# Patient Record
Sex: MALE | Race: WHITE | HISPANIC OR LATINO | Employment: OTHER | URBAN - METROPOLITAN AREA
[De-identification: names, ages, dates, MRNs, and addresses within clinical notes are randomized per-mention and may not be internally consistent; named-entity substitution may affect disease eponyms.]

---

## 2022-11-03 ENCOUNTER — APPOINTMENT (EMERGENCY)
Dept: RADIOLOGY | Facility: HOSPITAL | Age: 82
End: 2022-11-03

## 2022-11-03 ENCOUNTER — APPOINTMENT (EMERGENCY)
Dept: CT IMAGING | Facility: HOSPITAL | Age: 82
End: 2022-11-03

## 2022-11-03 ENCOUNTER — HOSPITAL ENCOUNTER (EMERGENCY)
Facility: HOSPITAL | Age: 82
Discharge: HOME | End: 2022-11-03
Attending: SURGERY

## 2022-11-03 VITALS
HEART RATE: 82 BPM | TEMPERATURE: 97.8 F | WEIGHT: 162.48 LBS | SYSTOLIC BLOOD PRESSURE: 163 MMHG | DIASTOLIC BLOOD PRESSURE: 96 MMHG | OXYGEN SATURATION: 96 % | RESPIRATION RATE: 18 BRPM
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
## 2022-11-03 DIAGNOSIS — I10 HYPERTENSION, UNSPECIFIED TYPE: ICD-10-CM

## 2022-11-03 LAB
BASE EXCESS BLDA CALC-SCNC: 4 MMOL/L (ref -2–3)
CA-I BLD-SCNC: 1.19 MMOL/L (ref 1.12–1.32)
GLUCOSE SERPL-MCNC: 139 MG/DL (ref 65–140)
HCO3 BLDA-SCNC: 30.2 MMOL/L (ref 24–30)
HCT VFR BLD CALC: 44 % (ref 36.5–49.3)
HGB BLDA-MCNC: 15 G/DL (ref 12–17)
PCO2 BLD: 32 MMOL/L (ref 21–32)
PCO2 BLD: 52.4 MM HG (ref 42–50)
PH BLD: 7.37 [PH] (ref 7.3–7.4)
PO2 BLD: 20 MM HG (ref 35–45)
POTASSIUM BLD-SCNC: 4.2 MMOL/L (ref 3.5–5.3)
SAO2 % BLD FROM PO2: 30 % (ref 60–85)
SODIUM BLD-SCNC: 140 MMOL/L (ref 136–145)
SPECIMEN SOURCE: ABNORMAL

## 2022-11-03 RX ORDER — LABETALOL HYDROCHLORIDE 5 MG/ML
10 INJECTION, SOLUTION INTRAVENOUS ONCE
Status: COMPLETED | OUTPATIENT
Start: 2022-11-03 | End: 2022-11-03

## 2022-11-03 RX ADMIN — Medication 10 MG: at 18:57
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
## 2022-11-03 NOTE — PROCEDURES
POC FAST US    Date/Time: 11/3/2022 6:38 PM  Performed by: Summer Prasad MD  Authorized by: Summer Prasad MD     Patient location:  Trauma  Procedure details:     Exam Type:  Diagnostic    Assess for:  Hemothorax, intra-abdominal fluid, pericardial effusion and pneumothorax    Technique: extended FAST      Views obtained:  Heart - Pericardial sac, Left thorax, Right thorax, RUQ - Jansen's Pouch, LUQ - Splenorenal space and Suprapubic - Pouch of Ankur    Image quality: diagnostic      Image availability:  Images available in PACS and video obtained  FAST Findings:     RUQ (Hepatorenal) free fluid: absent      LUQ (Splenorenal) free fluid: absent      Suprapubic free fluid: absent      Cardiac wall motion: identified      Pericardial effusion: absent    extended FAST (Pulmonary) findings:     Left lung sliding: Present      Right lung sliding: Present      Left pleural effusion: Absent      Right pleural effusion: Absent    Interpretation:     Impressions: negative

## 2022-11-06 LAB
ATRIAL RATE: 66 BPM
QRS AXIS: -33 DEGREES
QRSD INTERVAL: 86 MS
QT INTERVAL: 368 MS
QTC INTERVAL: 447 MS
T WAVE AXIS: 47 DEGREES
VENTRICULAR RATE: 89 BPM

## 2025-01-01 ENCOUNTER — HOME CARE VISIT (OUTPATIENT)
Dept: HOME HOSPICE | Facility: HOSPICE | Age: 85
End: 2025-01-01
Payer: MEDICARE

## 2025-01-01 ENCOUNTER — TELEPHONE (OUTPATIENT)
Age: 85
End: 2025-01-01

## 2025-01-01 ENCOUNTER — HOSPICE ADMISSION (OUTPATIENT)
Dept: HOME HOSPICE | Facility: HOSPICE | Age: 85
End: 2025-01-01
Payer: MEDICARE

## 2025-01-01 ENCOUNTER — HOME CARE VISIT (OUTPATIENT)
Dept: HOME HEALTH SERVICES | Facility: HOME HEALTHCARE | Age: 85
End: 2025-01-01
Payer: MEDICARE

## 2025-01-01 VITALS
DIASTOLIC BLOOD PRESSURE: 60 MMHG | SYSTOLIC BLOOD PRESSURE: 110 MMHG | RESPIRATION RATE: 37 BRPM | HEART RATE: 129 BPM | TEMPERATURE: 98.6 F

## 2025-01-01 VITALS — RESPIRATION RATE: 26 BRPM | SYSTOLIC BLOOD PRESSURE: 180 MMHG | HEART RATE: 93 BPM | DIASTOLIC BLOOD PRESSURE: 105 MMHG

## 2025-01-01 VITALS
TEMPERATURE: 97.9 F | DIASTOLIC BLOOD PRESSURE: 100 MMHG | HEART RATE: 96 BPM | SYSTOLIC BLOOD PRESSURE: 170 MMHG | RESPIRATION RATE: 20 BRPM

## 2025-01-01 PROCEDURE — G0156 HHCP-SVS OF AIDE,EA 15 MIN: HCPCS

## 2025-01-01 PROCEDURE — 10330064 UNDERPAD, REUSE 36X36 1DZ     BECKCL

## 2025-01-01 PROCEDURE — 10330064 BRIEF, TAB CLSR ULTRA XLG 59-64 (15/BG 4

## 2025-01-01 PROCEDURE — G0299 HHS/HOSPICE OF RN EA 15 MIN: HCPCS

## 2025-01-01 PROCEDURE — 10330087 HSPC SERVICE INTENSITY ADD-ON

## 2025-01-01 PROCEDURE — 10330064 BRIEF, TAB CLSR ULTRA LG 45-58LG (18/BG

## 2025-01-01 PROCEDURE — 10330057 MEDICATION, GENERAL

## 2025-01-01 PROCEDURE — G0155 HHCP-SVS OF CSW,EA 15 MIN: HCPCS

## 2025-04-02 ENCOUNTER — APPOINTMENT (EMERGENCY)
Dept: CT IMAGING | Facility: HOSPITAL | Age: 85
End: 2025-04-02
Payer: MEDICARE

## 2025-04-02 ENCOUNTER — HOSPITAL ENCOUNTER (INPATIENT)
Facility: HOSPITAL | Age: 85
LOS: 1 days | Discharge: HOSPICE | End: 2025-04-04
Attending: EMERGENCY MEDICINE | Admitting: FAMILY MEDICINE
Payer: MEDICARE

## 2025-04-02 DIAGNOSIS — R29.90 STROKE-LIKE SYMPTOMS: Primary | ICD-10-CM

## 2025-04-02 DIAGNOSIS — Z51.5 HOSPICE CARE: ICD-10-CM

## 2025-04-02 DIAGNOSIS — I63.9 CVA (CEREBRAL VASCULAR ACCIDENT) (HCC): ICD-10-CM

## 2025-04-02 DIAGNOSIS — F03.90 DEMENTIA (HCC): ICD-10-CM

## 2025-04-02 LAB
ANION GAP SERPL CALCULATED.3IONS-SCNC: 5 MMOL/L (ref 4–13)
APTT PPP: 29 SECONDS (ref 23–34)
BUN SERPL-MCNC: 31 MG/DL (ref 5–25)
CALCIUM SERPL-MCNC: 8.7 MG/DL (ref 8.4–10.2)
CARDIAC TROPONIN I PNL SERPL HS: 10 NG/L (ref ?–50)
CHLORIDE SERPL-SCNC: 105 MMOL/L (ref 96–108)
CO2 SERPL-SCNC: 28 MMOL/L (ref 21–32)
CREAT SERPL-MCNC: 1.23 MG/DL (ref 0.6–1.3)
ERYTHROCYTE [DISTWIDTH] IN BLOOD BY AUTOMATED COUNT: 12.3 % (ref 11.6–15.1)
GFR SERPL CREATININE-BSD FRML MDRD: 52 ML/MIN/1.73SQ M
GLUCOSE SERPL-MCNC: 96 MG/DL (ref 65–140)
HCT VFR BLD AUTO: 43.3 % (ref 36.5–49.3)
HGB BLD-MCNC: 14.2 G/DL (ref 12–17)
INR PPP: 1.01 (ref 0.85–1.19)
MCH RBC QN AUTO: 30.3 PG (ref 26.8–34.3)
MCHC RBC AUTO-ENTMCNC: 32.8 G/DL (ref 31.4–37.4)
MCV RBC AUTO: 93 FL (ref 82–98)
PLATELET # BLD AUTO: 168 THOUSANDS/UL (ref 149–390)
PMV BLD AUTO: 10.7 FL (ref 8.9–12.7)
POTASSIUM SERPL-SCNC: 4.3 MMOL/L (ref 3.5–5.3)
PROTHROMBIN TIME: 14 SECONDS (ref 12.3–15)
RBC # BLD AUTO: 4.68 MILLION/UL (ref 3.88–5.62)
SODIUM SERPL-SCNC: 138 MMOL/L (ref 135–147)
WBC # BLD AUTO: 7.86 THOUSAND/UL (ref 4.31–10.16)

## 2025-04-02 PROCEDURE — 36415 COLL VENOUS BLD VENIPUNCTURE: CPT

## 2025-04-02 PROCEDURE — 85730 THROMBOPLASTIN TIME PARTIAL: CPT

## 2025-04-02 PROCEDURE — 80048 BASIC METABOLIC PNL TOTAL CA: CPT

## 2025-04-02 PROCEDURE — 99285 EMERGENCY DEPT VISIT HI MDM: CPT

## 2025-04-02 PROCEDURE — 93005 ELECTROCARDIOGRAM TRACING: CPT

## 2025-04-02 PROCEDURE — 85027 COMPLETE CBC AUTOMATED: CPT

## 2025-04-02 PROCEDURE — 70496 CT ANGIOGRAPHY HEAD: CPT

## 2025-04-02 PROCEDURE — 70498 CT ANGIOGRAPHY NECK: CPT

## 2025-04-02 PROCEDURE — 85610 PROTHROMBIN TIME: CPT

## 2025-04-02 PROCEDURE — 84484 ASSAY OF TROPONIN QUANT: CPT

## 2025-04-02 RX ADMIN — IOHEXOL 85 ML: 350 INJECTION, SOLUTION INTRAVENOUS at 22:28

## 2025-04-02 NOTE — Clinical Note
Case was discussed with DARIO and the patient's admission status was agreed to be Admission Status: inpatient status to the service of Dr. Armijo .

## 2025-04-03 ENCOUNTER — APPOINTMENT (OUTPATIENT)
Dept: NON INVASIVE DIAGNOSTICS | Facility: HOSPITAL | Age: 85
End: 2025-04-03
Payer: MEDICARE

## 2025-04-03 ENCOUNTER — HOME CARE VISIT (OUTPATIENT)
Dept: HOME HOSPICE | Facility: HOSPICE | Age: 85
End: 2025-04-03
Payer: MEDICARE

## 2025-04-03 PROBLEM — I48.91 ATRIAL FIBRILLATION (HCC): Status: ACTIVE | Noted: 2025-01-01

## 2025-04-03 PROBLEM — R29.90 STROKE-LIKE SYMPTOMS: Status: ACTIVE | Noted: 2025-04-03

## 2025-04-03 PROBLEM — F03.90 DEMENTIA (HCC): Status: ACTIVE | Noted: 2025-01-01

## 2025-04-03 PROBLEM — R26.2 AMBULATORY DYSFUNCTION: Status: ACTIVE | Noted: 2025-01-01

## 2025-04-03 PROBLEM — E04.1 THYROID NODULE: Status: ACTIVE | Noted: 2025-01-01

## 2025-04-03 PROBLEM — I10 HYPERTENSION: Status: ACTIVE | Noted: 2025-01-01

## 2025-04-03 LAB
2HR DELTA HS TROPONIN: 0 NG/L
4HR DELTA HS TROPONIN: -2 NG/L
ALBUMIN SERPL BCG-MCNC: 3.5 G/DL (ref 3.5–5)
ALP SERPL-CCNC: 48 U/L (ref 34–104)
ALT SERPL W P-5'-P-CCNC: 13 U/L (ref 7–52)
ANION GAP SERPL CALCULATED.3IONS-SCNC: 6 MMOL/L (ref 4–13)
AST SERPL W P-5'-P-CCNC: 17 U/L (ref 13–39)
ATRIAL RATE: 326 BPM
ATRIAL RATE: 71 BPM
ATRIAL RATE: 71 BPM
BASOPHILS # BLD AUTO: 0.03 THOUSANDS/ÂΜL (ref 0–0.1)
BASOPHILS NFR BLD AUTO: 1 % (ref 0–1)
BILIRUB SERPL-MCNC: 1.4 MG/DL (ref 0.2–1)
BUN SERPL-MCNC: 25 MG/DL (ref 5–25)
CALCIUM SERPL-MCNC: 8.4 MG/DL (ref 8.4–10.2)
CARDIAC TROPONIN I PNL SERPL HS: 10 NG/L (ref ?–50)
CARDIAC TROPONIN I PNL SERPL HS: 8 NG/L (ref ?–50)
CHLORIDE SERPL-SCNC: 108 MMOL/L (ref 96–108)
CHOLEST SERPL-MCNC: 164 MG/DL (ref ?–200)
CO2 SERPL-SCNC: 23 MMOL/L (ref 21–32)
CREAT SERPL-MCNC: 1.04 MG/DL (ref 0.6–1.3)
EOSINOPHIL # BLD AUTO: 0.15 THOUSAND/ÂΜL (ref 0–0.61)
EOSINOPHIL NFR BLD AUTO: 2 % (ref 0–6)
ERYTHROCYTE [DISTWIDTH] IN BLOOD BY AUTOMATED COUNT: 12.4 % (ref 11.6–15.1)
EST. AVERAGE GLUCOSE BLD GHB EST-MCNC: 126 MG/DL
GFR SERPL CREATININE-BSD FRML MDRD: 64 ML/MIN/1.73SQ M
GLUCOSE P FAST SERPL-MCNC: 82 MG/DL (ref 65–99)
GLUCOSE SERPL-MCNC: 114 MG/DL (ref 65–140)
GLUCOSE SERPL-MCNC: 82 MG/DL (ref 65–140)
HBA1C MFR BLD: 6 %
HCT VFR BLD AUTO: 40.2 % (ref 36.5–49.3)
HDLC SERPL-MCNC: 37 MG/DL
HGB BLD-MCNC: 13.3 G/DL (ref 12–17)
IMM GRANULOCYTES # BLD AUTO: 0.01 THOUSAND/UL (ref 0–0.2)
IMM GRANULOCYTES NFR BLD AUTO: 0 % (ref 0–2)
LDLC SERPL CALC-MCNC: 109 MG/DL (ref 0–100)
LYMPHOCYTES # BLD AUTO: 2.17 THOUSANDS/ÂΜL (ref 0.6–4.47)
LYMPHOCYTES NFR BLD AUTO: 34 % (ref 14–44)
MAGNESIUM SERPL-MCNC: 2.2 MG/DL (ref 1.9–2.7)
MCH RBC QN AUTO: 30.1 PG (ref 26.8–34.3)
MCHC RBC AUTO-ENTMCNC: 33.1 G/DL (ref 31.4–37.4)
MCV RBC AUTO: 91 FL (ref 82–98)
MONOCYTES # BLD AUTO: 0.59 THOUSAND/ÂΜL (ref 0.17–1.22)
MONOCYTES NFR BLD AUTO: 9 % (ref 4–12)
NEUTROPHILS # BLD AUTO: 3.35 THOUSANDS/ÂΜL (ref 1.85–7.62)
NEUTS SEG NFR BLD AUTO: 54 % (ref 43–75)
NRBC BLD AUTO-RTO: 0 /100 WBCS
PLATELET # BLD AUTO: 156 THOUSANDS/UL (ref 149–390)
PMV BLD AUTO: 10.4 FL (ref 8.9–12.7)
POTASSIUM SERPL-SCNC: 4 MMOL/L (ref 3.5–5.3)
PROT SERPL-MCNC: 5.8 G/DL (ref 6.4–8.4)
QRS AXIS: -89 DEGREES
QRS AXIS: 218 DEGREES
QRS AXIS: 225 DEGREES
QRS AXIS: 245 DEGREES
QRSD INTERVAL: 102 MS
QRSD INTERVAL: 82 MS
QRSD INTERVAL: 84 MS
QRSD INTERVAL: 98 MS
QT INTERVAL: 400 MS
QT INTERVAL: 414 MS
QT INTERVAL: 416 MS
QT INTERVAL: 432 MS
QTC INTERVAL: 445 MS
QTC INTERVAL: 452 MS
QTC INTERVAL: 456 MS
QTC INTERVAL: 480 MS
RBC # BLD AUTO: 4.42 MILLION/UL (ref 3.88–5.62)
SODIUM SERPL-SCNC: 137 MMOL/L (ref 135–147)
T WAVE AXIS: -33 DEGREES
T WAVE AXIS: 47 DEGREES
T WAVE AXIS: 5 DEGREES
T WAVE AXIS: 95 DEGREES
TRIGL SERPL-MCNC: 89 MG/DL (ref ?–150)
VENTRICULAR RATE: 64 BPM
VENTRICULAR RATE: 71 BPM
VENTRICULAR RATE: 78 BPM
VENTRICULAR RATE: 81 BPM
WBC # BLD AUTO: 6.3 THOUSAND/UL (ref 4.31–10.16)

## 2025-04-03 PROCEDURE — 80053 COMPREHEN METABOLIC PANEL: CPT

## 2025-04-03 PROCEDURE — 82948 REAGENT STRIP/BLOOD GLUCOSE: CPT

## 2025-04-03 PROCEDURE — 84484 ASSAY OF TROPONIN QUANT: CPT

## 2025-04-03 PROCEDURE — NC001 PR NO CHARGE: Performed by: PHYSICIAN ASSISTANT

## 2025-04-03 PROCEDURE — 83036 HEMOGLOBIN GLYCOSYLATED A1C: CPT

## 2025-04-03 PROCEDURE — 80061 LIPID PANEL: CPT

## 2025-04-03 PROCEDURE — 93005 ELECTROCARDIOGRAM TRACING: CPT

## 2025-04-03 PROCEDURE — 97163 PT EVAL HIGH COMPLEX 45 MIN: CPT

## 2025-04-03 PROCEDURE — 99223 1ST HOSP IP/OBS HIGH 75: CPT | Performed by: FAMILY MEDICINE

## 2025-04-03 PROCEDURE — 36415 COLL VENOUS BLD VENIPUNCTURE: CPT

## 2025-04-03 PROCEDURE — 93010 ELECTROCARDIOGRAM REPORT: CPT | Performed by: INTERNAL MEDICINE

## 2025-04-03 PROCEDURE — 85025 COMPLETE CBC W/AUTO DIFF WBC: CPT

## 2025-04-03 PROCEDURE — 83735 ASSAY OF MAGNESIUM: CPT

## 2025-04-03 PROCEDURE — 97167 OT EVAL HIGH COMPLEX 60 MIN: CPT

## 2025-04-03 RX ORDER — BISACODYL 10 MG
10 SUPPOSITORY, RECTAL RECTAL DAILY PRN
Status: DISCONTINUED | OUTPATIENT
Start: 2025-04-03 | End: 2025-04-04 | Stop reason: HOSPADM

## 2025-04-03 RX ORDER — GLYCOPYRROLATE 0.2 MG/ML
0.1 INJECTION INTRAMUSCULAR; INTRAVENOUS EVERY 4 HOURS PRN
Status: DISCONTINUED | OUTPATIENT
Start: 2025-04-03 | End: 2025-04-04 | Stop reason: HOSPADM

## 2025-04-03 RX ORDER — LORAZEPAM 2 MG/ML
1 INJECTION INTRAMUSCULAR
Status: DISCONTINUED | OUTPATIENT
Start: 2025-04-03 | End: 2025-04-04 | Stop reason: HOSPADM

## 2025-04-03 RX ORDER — ASPIRIN 81 MG/1
81 TABLET, CHEWABLE ORAL DAILY
Status: DISCONTINUED | OUTPATIENT
Start: 2025-04-03 | End: 2025-04-03

## 2025-04-03 RX ORDER — ASPIRIN 81 MG/1
81 TABLET, CHEWABLE ORAL DAILY
COMMUNITY
End: 2025-04-04

## 2025-04-03 RX ORDER — HEPARIN SODIUM 5000 [USP'U]/ML
5000 INJECTION, SOLUTION INTRAVENOUS; SUBCUTANEOUS EVERY 8 HOURS SCHEDULED
Status: DISCONTINUED | OUTPATIENT
Start: 2025-04-03 | End: 2025-04-03

## 2025-04-03 RX ORDER — ATORVASTATIN CALCIUM 40 MG/1
40 TABLET, FILM COATED ORAL EVERY EVENING
Status: DISCONTINUED | OUTPATIENT
Start: 2025-04-03 | End: 2025-04-03

## 2025-04-03 RX ORDER — CLOPIDOGREL 300 MG/1
300 TABLET, FILM COATED ORAL ONCE
Status: COMPLETED | OUTPATIENT
Start: 2025-04-03 | End: 2025-04-03

## 2025-04-03 RX ORDER — LORAZEPAM 0.5 MG
1 TABLET ORAL DAILY
COMMUNITY
End: 2025-04-04

## 2025-04-03 RX ADMIN — SODIUM CHLORIDE 1000 ML: 0.9 INJECTION, SOLUTION INTRAVENOUS at 13:23

## 2025-04-03 RX ADMIN — SODIUM CHLORIDE 500 ML: 0.9 INJECTION, SOLUTION INTRAVENOUS at 01:30

## 2025-04-03 RX ADMIN — CLOPIDOGREL BISULFATE 300 MG: 300 TABLET, FILM COATED ORAL at 01:25

## 2025-04-03 NOTE — CONSULTS
Consultation - Neurology   Name: Artie Zavala 84 y.o. male I MRN: [id number]  Unit/Bed#: 2 E 278-01 I Date of Admission: 4/2/2025   Date of Service: 4/3/2025 I Hospital Day: 0   Consult to Neurology  Consult performed by: LIVIER Rogers  Consult ordered by: Behzad Steel PA-C      Physician Requesting Evaluation: Raul Cai MD   Reason for Evaluation / Principal Problem: stroke alert    Assessment & Plan  Stroke-like symptoms   84 y.o.  male with progressive cognitive impairment over the past 5-7 years, AF not on ac by choice, possible prior stroke vs TIA who presented to [facility] ED 4/2/25 with reported right facial droop, expressive aphasia, left sided weakness and ambulatory dysfunction.     Stroke alert initiated in the ED with NIHSS 6. Initial /92, BG 96. 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 Deemed not a TNK (time window) or thrombectomy candidate based on risk vs benefit conversation. He was loaded with plavix 300mg x1 (took ASA 81mg x3 PTA) and admitted on the stroke pathway for further work-up.    Neuroimaging/work-up:  4/2/25 CTH:No acute intracranial abnormality. Chronic ischemic changes.  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  Incidental thyroid nodule(s) for which nonemergent thyroid ultrasound is recommended. However, consider patient's age and clinical status to assess need for follow-up.  -  Recommendations:  Unclear etiology of fluctuating exam; possible stroke vs seizure vs tme with baseline cognitive impairment  -continue home asa 81mg daily  -permissive htn; avoid hypotension  -Discussed initial imaging results and clinical concerns based on exam/history with family at bedside.   -Reviewed neurological recommendations for comprehensive stroke and/or seizure work-up including EEG, MRI, ECHO as well as medication recommendations for  "secondary stroke prevention and seizure prophylaxis.   -Family reported that the patient (prior to cognitive decline) expressed that he did not wish to take medicine or pursue medical interventions.  -Based on our conversation and reviewing what neurology could offer the patient in terms of work-up and treatment options it was agreed upon that palliative care consultation would be beneficial to assist family with guiding goals of care decisions. That being said, further work-up on hold pending ongoing goals of care conversations as a transition to home hospice may be an option.   -Palliative care consult placed.    Recommendations for outpatient neurological follow up have yet to be determined.    History of Present Illness   Artie Zavala is a 84 y.o.  male with progressive cognitive impairment over the past 5-7 years, AF not on ac by choice, possible prior stroke vs TIA who presented to [facility] ED 4/2/25 with reported right facial droop, expressive aphasia, left sided weakness and ambulatory dysfunction.     Stroke alert initiated in the ED with NIHSS 6. Initial /92, BG 96. 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 Deemed not a TNK (time window) or thrombectomy candidate based on risk vs benefit conversation. He was loaded with plavix 300mg x1 (took ASA 81mg x3 PTA) and admitted on the stroke pathway for further work-up.    Patient's family report symptom onset around 1pm, with noted head turned to the left, LUE rigidity  and minimal responsiveness lasting about 1 hour until he returned to nearly his baseline with the exception of limited speech and worsening confusion. He ate something, went upstairs then took a nap. When he woke up around 1830, family noted a right facial droop prompting ED evaluation. Family additionally reports occasional transient episodes of becoming \"frozen\", unresponsive and nonverbal over the " "past few years that last about 10-15 minutes. They have also noticed occasional shaking of his extremities that do not consistently correspond to \"frozen\" episodes. At baseline, he is able to tend to his ADLs including feeding and dressing himself, lives with family for additional assistance/safety.    Review of Systems   Unable to perform ROS: Mental status change      Objective :  Temp:  [96.5 °F (35.8 °C)-98.2 °F (36.8 °C)] 97.2 °F (36.2 °C)  HR:  [64-78] 65  BP: (106-179)/() 168/95  Resp:  [14-20] 16  SpO2:  [95 %-100 %] 98 %  O2 Device: Nasal cannula    Physical Exam  Vitals reviewed.   Constitutional:       General: He is not in acute distress.  HENT:      Head: Normocephalic and atraumatic.   Pulmonary:      Effort: Pulmonary effort is normal.   Skin:     General: Skin is warm and dry.     Neurological Exam  Mental Status    Fluctuating and limited exam due to AMS   At this time he is alert with very limited, slurred speech  PERRL, attends examiner but does not participate with EOM testing  Right facial droop noted  Does not FC or name objects  Able to lift all extremities antigravity, appears to somewhat neglect the left side.      I personally reviewed pertinent labs and neuroimaging as noted above    VTE Prophylaxis: VTE covered by:  heparin (porcine), Subcutaneous     Assessment, images and plan reviewed with Dr. Joe. Plan discussed with patient, family at bedside and primary service. Please refer to attending attestation for additional recommendations  "

## 2025-04-03 NOTE — CASE MANAGEMENT
Case Management Assessment & Discharge Planning Note    Patient name Artie Zavala  Location [address] [address]/2 E 278-01 MRN [id number]  : 1940 Date 4/3/2025       Current Admission Date: 2025  Current Admission Diagnosis:Stroke-like symptoms   Patient Active Problem List    Diagnosis Date Noted Date Diagnosed    Stroke-like symptoms 2025     Ambulatory dysfunction 2025     Thyroid nodule 2025     Hypertension 2025     Atrial fibrillation (HCC) 2025     Dementia (HCC) 2025       LOS (days): 0  Geometric Mean LOS (GMLOS) (days): 3  Days to GMLOS:2.9     OBJECTIVE:    Risk of Unplanned Readmission Score: 7.98      Current admission status: Inpatient       Preferred Pharmacy:   [facility]/pharmacy #[id number] - [place] - [address]  [address]  [place] [address]  Phone: [phone] Fax: [phone]    Primary Care Provider: Will Riojas MD    Primary Insurance: MEDICARE  Secondary Insurance: COMMERCIAL MISCELLANEOUS    ASSESSMENT:  Active Health Care Proxies       Artie Zavala Health Care Representative - Son, Legal Guardian   Primary Phone: [phone] (Mobile)                 Advance Directives  Does patient have a Health Care POA?: No  Was patient offered paperwork?: Yes (Son declined.)  Does patient currently have a Health Care decision maker?: Yes, please see Health Care Proxy section  Does patient have Advance Directives?: No  Was patient offered paperwork?: Yes  Primary Contact: Patient's Son (Artie)    Readmission Root Cause  30 Day Readmission: No    Patient Information  Admitted from:: Home  Mental Status: Confused  During Assessment patient was accompanied by: Not accompanied during assessment  Assessment information provided by:: Son  Primary Caregiver: Self  Support Systems: Self, Family members, Son, Daughter  County of Residence: [place]  What city do you live in?: [place]  Home entry access options. Select all that apply.:  Stairs  Number of steps to enter home.: 3  Do the steps have railings?: Yes  Type of Current Residence: Bi-level  Upon entering residence, is there a bedroom on the main floor (no further steps)?: No  A bedroom is located on the following floor levels of residence (select all that apply):: 2nd Floor  Upon entering residence, is there a bathroom on the main floor (no further steps)?: No  Indicate which floors of current residence have a bathroom (select all the apply):: 2nd Floor  Number of steps to 2nd floor from main floor: 5  Living Arrangements: Lives w/ Daughter  Is patient a ?: No    Activities of Daily Living Prior to Admission  Functional Status: Independent  Completes ADLs independently?: Yes  Ambulates independently?: Yes  Does patient use assisted devices?: Yes  Assisted Devices (DME) used: Straight Cane, Wheelchair  Does patient currently own DME?: Yes  What DME does the patient currently own?: Straight Cane, Wheelchair  Does patient have a history of Outpatient Therapy (PT/OT)?: No  Does the patient have a history of Short-Term Rehab?: No  Does patient have a history of HHC?: No  Does patient currently have HHC?: No    Patient Information Continued  Income Source: SSI/SSD  Does patient have prescription coverage?: Yes  Can the patient afford their medications and any related supplies (such as glucometers or test strips)?: Yes  Does patient receive dialysis treatments?: No  Does patient have a history of substance abuse?: No  Does patient have a history of Mental Health Diagnosis?: No    Means of Transportation  [facility]    DISCHARGE DETAILS:    Discharge planning discussed with:: Patient's Son  Freedom of Choice: Yes  Comments - Freedom of Choice: XIOMARA discussed FOC as it pertains to discharge planning. XIOMARA reviewed hospice consult, and patient's son prefers [facility], if available. Their physical address is [address] [place] [address]. XIOMARA  agreeable to send the referral now and will call patient's son back once a determination is made. Patient's son confirmed that patient will have family support in the home at all times.  CM contacted family/caregiver?: Yes  Were Treatment Team discharge recommendations reviewed with patient/caregiver?: Yes  Did patient/caregiver verbalize understanding of patient care needs?: Yes  Were patient/caregiver advised of the risks associated with not following Treatment Team discharge recommendations?: Yes    Contacts  Patient Contacts: Artie  Relationship to Patient:: Family  Contact Method: Phone  Phone Number: [phone]  Reason/Outcome: Continuity of Care, Discharge Planning    Requested Home Health Care         Is the patient interested in HHC at discharge?: No    DME Referral Provided  Referral made for DME?: No    Other Referral/Resources/Interventions Provided:  Interventions: Hospice  Referral Comments: CM sent a referral to [facility] via [facility]. Response pending.    Would you like to participate in our [facility] service program?  : No - Declined    Treatment Team Recommendation: Hospice  Discharge Destination Plan:: Hospice  Transport at Discharge : Family, BLS Ambulance

## 2025-04-03 NOTE — PLAN OF CARE
Problem: PHYSICAL THERAPY ADULT  Goal: Performs mobility at highest level of function for planned discharge setting.  See evaluation for individualized goals.  Description: Treatment/Interventions: Functional transfer training, LE strengthening/ROM, Therapeutic exercise, Endurance training, Patient/family training, Equipment eval/education, Bed mobility, Compensatory technique education, Continued evaluation, Spoke to nursing, OT, Family          See flowsheet documentation for full assessment, interventions and recommendations.  4/3/2025 1358 by Letty Cha PT  Note: Prognosis: Guarded  Problem List: Decreased strength, Decreased endurance, Impaired balance, Decreased mobility, Decreased cognition  Assessment: Pt is 84 y.o. male seen for PT evaluation on 4/3/2025 s/p admit to [facility] on 4/2/2025 w/ Stroke-like symptoms. PT was consulted to assess pt's functional mobility and d/c needs. Order placed for PT eval and tx. PTA, pt resides with daughter in multi level home, ambulates with cane prn, w/c for longer distances. At time of eval, pt requiring mod-max assist x2 for bed mobility, min assist x2 for sit to stand transfer. During sit to stand attempt, pt with episode of urinary incontinence, and pt subsequently returned to seated position. Pt noted to have change in mental status, eyes closed, clammy skin, notable drooling. Pt's RN immediately notified and she called a rapid response. Pt was returned to supine. Vitals taken upon return /94, 65bpm, 99% on RA. Upon evaluation, pt presenting with impaired functional mobility d/t decreased strength, decreased endurance, impaired balance, decreased mobility, decreased cognition, and activity intolerance. Pertinent PMHx and current co-morbidities affecting pt's physical performance at time of assessment include: stroke like symptoms, ambulatory dysfunction, thyroid nodule, HTN, a fib, dementia. Personal factors affecting pt at time of eval include:  inaccessible home environment, inability to ambulate household distances, inability to navigate level surfaces w/o external assistance, decreased cognition, and decreased initiation and engagement. The following objective measures performed on IE also reveal limitations: Barthel Index: 15/100, Modified Bert: 5 (severe disability), and AM-PAC 6-Clicks: 6/24. Pt's clinical presentation is currently unstable/unpredictable seen in pt's presentation of advanced age, abnormal lab value(s), ongoing medical assessment, and on telemetry monitoring. Overall, pt's rehab potential and prognosis to return to PLOF is guarded as impacted by objective findings, warranting pt to receive further skilled PT interventions to address identified impairments, activity limitation(s), and participation restriction(s). Pt to benefit from continued PT tx to address deficits as defined above and maximize level of functional independent mobility. From PT/mobility standpoint, recommend level 2, moderate resource intensity in order to facilitate return to PLOF.  Barriers to Discharge: Inaccessible home environment     Rehab Resource Intensity Level, PT: II (Moderate Resource Intensity)    See flowsheet documentation for full assessment.     4/3/2025 1358 by Letty Cha PT  Note: Prognosis: Guarded  Problem List: Decreased strength, Decreased endurance, Impaired balance, Decreased mobility, Decreased cognition  Assessment: Pt is 84 y.o. male seen for PT evaluation on 4/3/2025 s/p admit to [facility] on 4/2/2025 w/ Stroke-like symptoms. PT was consulted to assess pt's functional mobility and d/c needs. Order placed for PT eval and tx. PTA, pt resides with daughter in multi level home, ambulates with cane prn, w/c for longer distances. At time of eval, pt requiring mod-max assist x2 for bed mobility, min assist x2 for sit to stand transfer. During sit to stand attempt, pt with episode of urinary incontinence, and pt subsequently returned  to seated position. Pt noted to have change in mental status, eyes closed, clammy skin, notable drooling. Pt's RN immediately notified and she called a rapid response. Pt was returned to supine. Vitals taken upon return /94, 65bpm, 99% on RA. Upon evaluation, pt presenting with impaired functional mobility d/t decreased strength, decreased endurance, impaired balance, decreased mobility, decreased cognition, and activity intolerance. Pertinent PMHx and current co-morbidities affecting pt's physical performance at time of assessment include: stroke like symptoms, ambulatory dysfunction, thyroid nodule, HTN, a fib, dementia. Personal factors affecting pt at time of eval include: inaccessible home environment, inability to ambulate household distances, inability to navigate level surfaces w/o external assistance, decreased cognition, and decreased initiation and engagement. The following objective measures performed on IE also reveal limitations: Barthel Index: 15/100, Modified Bert: 5 (severe disability), and AM-PAC 6-Clicks: 6/24. Pt's clinical presentation is currently unstable/unpredictable seen in pt's presentation of advanced age, abnormal lab value(s), ongoing medical assessment, and on telemetry monitoring. Overall, pt's rehab potential and prognosis to return to PLOF is guarded as impacted by objective findings, warranting pt to receive further skilled PT interventions to address identified impairments, activity limitation(s), and participation restriction(s). Pt to benefit from continued PT tx to address deficits as defined above and maximize level of functional independent mobility. From PT/mobility standpoint, recommend level 2, moderate resource intensity in order to facilitate return to PLOF.  Barriers to Discharge: Inaccessible home environment     Rehab Resource Intensity Level, PT: II (Moderate Resource Intensity)    See flowsheet documentation for full assessment.

## 2025-04-03 NOTE — ED NOTES
Per Provider ok to end Stroke narrator and start q1h Neuro checks and vitals.      Key Palma RN  04/03/25 0057

## 2025-04-03 NOTE — QUICK NOTE
Stroke alert note    Paged at 10:17pm    Responded at 10:18pm    NIH = 9    Last known normal 1pm    Patient had new R sided symptoms. He does have dementia and can do ADLs with assistance.    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 Lou as well, family declined mechanical thrombectomy    Recommend admission stroke pathway for additional workup.

## 2025-04-03 NOTE — ASSESSMENT & PLAN NOTE
At a baseline ambulates with RW  PT/OT following  Fall precautions  Out of bed as tolerated  Encourage early and frequent mobilization  Encourage adequate hydration and nutrition  Provide adequate pain management   Continue with PT/OT for continued strength and balance training

## 2025-04-03 NOTE — ASSESSMENT & PLAN NOTE
Noted history of dementia in 2022 per chart review   Alert and Oriented X  Baseline:   No formal cognitive testing per chart review  CT stroke alert brain: moderate microangiopathic change; chronic infarction R basal ganglia stable; chronic lacunar infarctions R cerebellum, stable  TSH 2019 1.52, No B12 per chart review  Continue management of chronic conditions  Encourage physical, social and mental activities

## 2025-04-03 NOTE — CASE MANAGEMENT
Case Management Discharge Planning Note    Patient name Artie Zavala  Location [address] [place] [address]/2 [address]01 MRN [id number]  : 1940 Date 4/3/2025       Current Admission Date: 2025  Current Admission Diagnosis:Stroke-like symptoms   Patient Active Problem List    Diagnosis Date Noted Date Diagnosed    Stroke-like symptoms 2025     Ambulatory dysfunction 2025     Thyroid nodule 2025     Hypertension 2025     Atrial fibrillation (HCC) 2025     Dementia (HCC) 2025       LOS (days): 0  Geometric Mean LOS (GMLOS) (days): 3  Days to GMLOS:2.8     OBJECTIVE:  Risk of Unplanned Readmission Score: 7.98         Current admission status: Inpatient   Preferred Pharmacy:   [id number] - [place] - [address] [facility]  [place] [address]  Phone: [phone] Fax: [phone]    Primary Care Provider: Will Riojas MD    Primary Insurance: MEDICARE  Secondary Insurance: COMMERCIAL MISCELLANEOUS    DISCHARGE DETAILS:                                          Other Referral/Resources/Interventions Provided:  Referral Comments: [facility] has acepted pt. [facility] is going to evaluate.

## 2025-04-03 NOTE — ASSESSMENT & PLAN NOTE
Presented with   CTA stroke alert head/neck: L M1 occlusion  Neurology consulted  Per chart review, patient's family respecting his wishes and declined mechanical thrombectomy at this time

## 2025-04-03 NOTE — PLAN OF CARE
Problem: OCCUPATIONAL THERAPY ADULT  Goal: Performs self-care activities at highest level of function for planned discharge setting.  See evaluation for individualized goals.  Description: Treatment Interventions: ADL retraining, Functional transfer training, UE strengthening/ROM, Endurance training, Patient/family training, Equipment evaluation/education, Compensatory technique education, Continued evaluation, Energy conservation, Activityengagement          See flowsheet documentation for full assessment, interventions and recommendations.   Note: Limitation: Decreased ADL status, Decreased UE ROM, Decreased UE strength, Decreased Safe judgement during ADL, Decreased cognition, Decreased endurance, Decreased fine motor control, Decreased self-care trans, Decreased high-level ADLs  Prognosis: Fair  Assessment: Patient is a 84 y.o. male seen for OT evaluation s/p admit to [facility] on 4/2/2025 w/Stroke-like symptoms. Commorbidities affecting patient's functional performance at time of assessment include:  stroke like symptoms, ambulatory dysfunction, thyroid nodule, HTN, a fib, dementia.  Orders placed for OT evaluation and treatment.  Performed at least two patient identifiers during session including name and wristband.  Prior to admission, pt's daughter provided home s/u information and PLOF information as pt poor historian. Patient lives with family in a multi level house, 3 JOHN. 5 steps up to level bedroom/bathroom. Patient was ambulating without an AD, using SPC PRN and w/c for longer distances. Patient required some assistance for ADLs, and IADLs.  Personal factors affecting patient at time of initial evaluation include: difficulty performing ADLs and difficulty performing IADLs. Upon evaluation, patient requires maximal assist for UB ADLs, total assist for LB ADLs.   During sit to stand attempt, pt with episode of urinary incontinence, and pt subsequently returned to seated position. Pt noted  to have change in mental status, eyes closed, clammy skin, notable drooling. Pt's RN immediately notified and she called a rapid response. Pt was returned to supine. Vitals taken upon return /94, 65bpm, 99% on RA.Occupational performance is affected by the following deficits: orientation, attention span, decreased functional use of BUEs, in hand manipulation deficit with impaired reach, grasp and coordination, degenerative arthritic joint changes, impaired gross motor coordination, impaired fine motor coordination, dynamic sit/ stand balance deficit with poor standing tolerance time for self care and functional mobility, decreased activity tolerance, impaired memory, impaired problem solving, decreased safety awareness, and postural control and postural alignment deficit, requiring external assistance to complete transitional movements.  Patient to benefit from continued Occupational Therapy treatment while in the hospital to address deficits as defined above and maximize level of functional independence with ADLs and functional mobility. Occupational Performance areas to address include: eating, grooming , bathing/ shower, dressing, toilet hygiene, transfer to all surfaces, functional mobility, and IADLs: safety procedures. From OT standpoint, recommendation at time of d/c would be Level 1.     Rehab Resource Intensity Level, OT: I (Maximum Resource Intensity)

## 2025-04-03 NOTE — ASSESSMENT & PLAN NOTE
Noted history  Per chart review, not on AC and noted that since 2013  Previously ordered ASA 81 mg once daily  EKG yesterday showing Afib  ECHO being completed, pending results

## 2025-04-03 NOTE — PLAN OF CARE
Problem: PAIN - ADULT  Goal: Verbalizes/displays adequate comfort level or baseline comfort level  Description: Interventions:- Encourage patient to monitor pain and request assistance- Assess pain using appropriate pain scale- Administer analgesics based on type and severity of pain and evaluate response- Implement non-pharmacological measures as appropriate and evaluate response- Consider cultural and social influences on pain and pain management- Notify physician/advanced practitioner if interventions unsuccessful or patient reports new pain  Outcome: Progressing     Problem: INFECTION - ADULT  Goal: Absence or prevention of progression during hospitalization  Description: INTERVENTIONS:- Assess and monitor for signs and symptoms of infection- Monitor lab/diagnostic results- Monitor all insertion sites, i.e. indwelling lines, tubes, and drains- Monitor endotracheal if appropriate and nasal secretions for changes in amount and color- [place] appropriate cooling/warming therapies per order- Administer medications as ordered- Instruct and encourage patient and family to use good hand hygiene technique- Identify and instruct in appropriate isolation precautions for identified infection/condition  Outcome: Progressing  Goal: Absence of fever/infection during neutropenic period  Description: INTERVENTIONS:- Monitor WBC  Outcome: Progressing     Problem: SAFETY ADULT  Goal: Patient will remain free of falls  Description: INTERVENTIONS:- Educate patient/family on patient safety including physical limitations- Instruct patient to call for assistance with activity - Consult OT/PT to assist with strengthening/mobility - Keep Call bell within reach- Keep bed low and locked with side rails adjusted as appropriate- Keep care items and personal belongings within reach- Initiate and maintain comfort rounds- Make Fall Risk Sign visible to staff- Offer Toileting every 2 Hours, in advance of need- Initiate/Maintain bed/chair  alarm- Obtain necessary fall risk management equipment- Apply yellow socks and bracelet for high fall risk patients- Consider moving patient to room near nurses station  Outcome: Progressing  Goal: Maintain or return to baseline ADL function  Description: INTERVENTIONS:-  Assess patient's ability to carry out ADLs; assess patient's baseline for ADL function and identify physical deficits which impact ability to perform ADLs (bathing, care of mouth/teeth, toileting, grooming, dressing, etc.)- Assess/evaluate cause of self-care deficits - Assess range of motion- Assess patient's mobility; develop plan if impaired- Assess patient's need for assistive devices and provide as appropriate- Encourage maximum independence but intervene and supervise when necessary- Involve family in performance of ADLs- Assess for home care needs following discharge - Consider OT consult to assist with ADL evaluation and planning for discharge- Provide patient education as appropriate  Outcome: Progressing  Goal: Maintains/Returns to pre admission functional level  Description: INTERVENTIONS:- Perform AM-PAC 6 Click Basic Mobility/ Daily Activity assessment daily.- Set and communicate daily mobility goal to care team and patient/family/caregiver. - Collaborate with rehabilitation services on mobility goals if consulted- Perform Range of Motion 2 times a day.- Reposition patient every 2 hours.- Dangle patient 2 times a day- Stand patient 2 times a day- Ambulate patient 2 times a day- Out of bed to chair 2 times a day - Out of bed for meals 2 times a day- Out of bed for toileting- Record patient progress and toleration of activity level   Outcome: Progressing     Problem: DISCHARGE PLANNING  Goal: Discharge to home or other facility with appropriate resources  Description: INTERVENTIONS:- Identify barriers to discharge w/patient and caregiver- Arrange for needed discharge resources and transportation as appropriate- Identify discharge learning  needs (meds, wound care, etc.)- Arrange for interpretive services to assist at discharge as needed- Refer to Case Management Department for coordinating discharge planning if the patient needs post-hospital services based on physician/advanced practitioner order or complex needs related to functional status, cognitive ability, or social support system  Outcome: Progressing     Problem: Knowledge Deficit  Goal: Patient/family/caregiver demonstrates understanding of disease process, treatment plan, medications, and discharge instructions  Description: Complete learning assessment and assess knowledge base.Interventions:- Provide teaching at level of understanding- Provide teaching via preferred learning methods  Outcome: Progressing

## 2025-04-03 NOTE — SPEECH THERAPY NOTE
Speech Language/Pathology Missed Visit Note    Dysphagia evaluation orders received and appreciated.  Evaluation attempted x2.  Per chart, pt s/p RRT 2/2 unresponsiveness.  Family wishing to pursue comfort measures at this time.  Pleasure feeds have been initiated.  SLP will sign off.  Please re-consult should plan of care change.  D/w nursing.       Shanon Godwin MS, CCC-SLP  Speech-Language Pathologist  [place] #[id number]  [place] #[id number]

## 2025-04-03 NOTE — RAPID RESPONSE
Rapid Response Note  Artie Zavala 84 y.o. male MRN: [id number]  Unit/Bed#: 2 E 278-01 Encounter: [id number]    Rapid Response Notification(s):   Response called date/time:  4/3/2025 1:18 PM  Response team arrival date/time:  4/3/2025 1:19 PM  Response end date/time:  4/3/2025 1:34 PM  Level of care:  [facility]  Rapid response location:  [facility] unit  Primary reason for rapid response call:  Acute change in neuro status    Rapid Response Intervention(s):   Airway:  None  Breathing:  None  Circulation:  None  Fluids administered:  Normal saline  Medications administered:  None       Assessment:   AMS; CVA with hypoperfusion to penumbra due to orthostatism vs seizure vs worsening CVA    Plan:   Family pursuing more comfort focused care with transition to hospice  No CT scan, seizure monitoring, MAP pushes, or ICU transfer indicated at this time       Rapid Response Outcome:   Transfer:  Remain on floor  Primary service notified of transfer: Yes (Dr. Raul Cai at bedside)    Code Status: Level 3 (DNAR and DNI)      Family notified: Yes, at bedside for RRT     Background/Situation:   Artie Zavala is a 84 y.o. male who presented this with left M1 occlusion. He has a significant dementia history. He has had waxing and weaning mental status. There is a question of seizures as well. Family is largely discussing conservative care vs hospice and stroke imaging is on hold presently. He was attempting to be ambulated with PT and he had unresponsiveness, loss of bladder control and unresponsiveness. On my arrival he was only responsive to sternal rub, left sided gaze preference, reflexive hand grasping but no following of commands. His BP is markedly lower than it was previously so 1 L NSS ordered. iStat and EKG ordered. Dr. Raul Cai, Dr. Maggie Cai, and Dr. Mendoza from neurology discussed with family and they want to continue discussion with pathway toward hospice. We discontinued the remainder of fluids and are  foregoing CT scan, seizure monitoring, MAP pushes, and/or ICU upgrade presently.     Review of Systems   Unable to perform ROS: Patient unresponsive       Objective:   Vitals:    04/03/25 1206 04/03/25 1319 04/03/25 1325 04/03/25 1327   BP: (!) 171/97 118/94 140/78 118/94   BP Location:       Pulse: 67 65 72 68   Resp: 17      Temp: (!) 97.4 °F (36.3 °C)      TempSrc:       SpO2: 98% 99% 97% 94%   Weight:       Height:         Physical Exam  Vitals reviewed.   HENT:      Head: Normocephalic and atraumatic.   Cardiovascular:      Rate and Rhythm: Normal rate. Rhythm irregular.      Heart sounds: Normal heart sounds.   Pulmonary:      Breath sounds: Normal breath sounds.   Abdominal:      General: Bowel sounds are normal.      Tenderness: There is no abdominal tenderness.   Genitourinary:     Comments: Deferred  Musculoskeletal:         General: Normal range of motion.   Skin:     Capillary Refill: Capillary refill takes less than 2 seconds.   Neurological:      Mental Status: He is lethargic.     Arouses to loud voice at times, other times needing noxious stimuli. Gaze preference to the left. Does not follow commands

## 2025-04-03 NOTE — ASSESSMENT & PLAN NOTE
84 y.o.  male with progressive cognitive impairment over the past 5-7 years, AF not on ac by choice, possible prior stroke vs TIA who presented to [facility] ED 4/2/25 with reported right facial droop, expressive aphasia, left sided weakness and ambulatory dysfunction.     Stroke alert initiated in the ED with NIHSS 6. Initial /92, BG 96. 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 Deemed not a TNK (time window) or thrombectomy candidate based on risk vs benefit conversation. He was loaded with plavix 300mg x1 (took ASA 81mg x3 PTA) and admitted on the stroke pathway for further work-up.    Neuroimaging/work-up:  4/2/25 CTH:No acute intracranial abnormality. Chronic ischemic changes.  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  Incidental thyroid nodule(s) for which nonemergent thyroid ultrasound is recommended. However, consider patient's age and clinical status to assess need for follow-up.  -  Recommendations:  Unclear etiology of fluctuating exam; possible stroke vs seizure vs tme with baseline cognitive impairment  -continue home asa 81mg daily  -permissive htn; avoid hypotension  -Discussed initial imaging results and clinical concerns based on exam/history with family at bedside.   -Reviewed neurological recommendations for comprehensive stroke and/or seizure work-up including EEG, MRI, ECHO as well as medication recommendations for secondary stroke prevention and seizure prophylaxis.   -Family reported that the patient (prior to cognitive decline) expressed that he did not wish to take medicine or pursue medical interventions.  -Based on our conversation and reviewing what neurology could offer the patient in terms of work-up and treatment options it was agreed upon that palliative care consultation would be beneficial to assist family with guiding goals of care  decisions. That being said, further work-up on hold pending ongoing goals of care conversations as a transition to home hospice may be an option.   -Palliative care consult placed.

## 2025-04-03 NOTE — NURSING NOTE
Patient was working with occupational therapy when he became unresponsive, rapid response  called at 1318. Rapid Response team at bedside.       Melina Nieto RN

## 2025-04-03 NOTE — ASSESSMENT & PLAN NOTE
Incidental finding-Multinodular left lobe thyroid gland, with nodules measuring up to 2.7 cm.   POA notified, that patient will need to follow-up with a ultrasound in the future.

## 2025-04-03 NOTE — ASSESSMENT & PLAN NOTE
Blood pressure elevated upon arrival to ED at 179/92, HR 73.  Current /91, HR 71.  Permissive hypertension.  Monitor BP and HR.

## 2025-04-03 NOTE — ED PROVIDER NOTES
Time reflects when diagnosis was documented in both MDM as applicable and the Disposition within this note       Time User Action Codes Description Comment    4/2/2025 10:15 PM Behzad Steel Add [R29.90] Stroke-like symptoms     4/3/2025  1:22 AM Behzad Steel Add [I63.9] CVA (cerebral vascular accident) (HCC)     4/3/2025  3:31 AM Chiqui Gutierres Add [F03.90] Dementia (HCC)           ED Disposition       ED Disposition   Admit    Condition   Stable    Date/Time   Thu Apr 3, 2025  1:56 AM    Comment   Case was discussed with DARIO and the patient's admission status was agreed to be Admission Status: obs status to the service of Dr. Armijo .               Assessment & Plan       Medical Decision Making  This patient presents with symptoms concerning for acute CVA versus TIA. Other items on the differential include dissection, AMI, hypoglycemia or other metabolic derangement such as hepatic/uremic encephalopathy, medication side effect, or post-ictal Lincoln's paralysis. However, presentation most concerning for a CVA. EKG without evidence of STEMI or ischemia, labs with no hypoglycemia, metabolic derangements, and clinical picture does not suggest other stroke mimic. CT head showed No acute intracranial abnormality. Chronic ischemic changes. 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  Incidental thyroid nodule(s) for which nonemergent thyroid ultrasound is recommended. However, consider patient's age and clinical status to assess need for follow-up.  Patient exam does not definitively correlate with CT findings. Patient has asymmetrical smile but is weaker on the left than the right. Per family patient baseline is very forgetful even within the hour of things and events. Patient mostly laughing on my exam and is able to answer some yes or no questions. Discussed case with neurology on-call as well as endovascular. Per endovascular do not believe patient is  a great candidate for thrombectomy given age, comorbidities, and needing assistance at baseline with ADLs.  Discussed the results of the scan and possible treatment with the son Artie Zavala who is the POA. Discussed at length the tnk window and why he is not a candidate for the medication and possible risks and benefits to endovascular intervention. After lengthy discussion with the POA and his discussion with his family members they would not like to pursue endovascular intervention.  POA also states he would like him to be DNR/DNI at this time. Per neuro load plavix and admit on the stroke pathway. Patient will need neuro consult, speech swallow eval, PT/OT. Per nurse patient speech has been improving throughout stay.     Problems Addressed:  CVA (cerebral vascular accident) (HCC): acute illness or injury  Stroke-like symptoms: acute illness or injury    Amount and/or Complexity of Data Reviewed  Labs: ordered. Decision-making details documented in ED Course.  Radiology: ordered. Decision-making details documented in ED Course.    Risk  Prescription drug management.  Decision regarding hospitalization.        ED Course as of 04/04/25 0730   Wed Apr 02, 2025   2320 Occlusion on scan. Discussed with neurology.  Discussion with POA and family at bedside of occlusion and interventional options.  Discussed risk versus benefit.  Family would like to talk it over at this time.     2343 CT stroke alert brain  No acute intracranial abnormality. Chronic ischemic changes.   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     Incidental thyroid nodule(s) for which nonemergent thyroid ultrasound is recommended. However, consider patient's age and clinical status to assess need for follow-up.     2343 After discussion with POA and family they decided against intervention with endovascular.    u Apr 03, 2025   0059 Delta 2hr hsTnI: 0       Medications    iohexol (OMNIPAQUE) 350 MG/ML injection (MULTI-DOSE) 100 mL (85 mL Intravenous Given 4/2/25 2228)   sodium chloride 0.9 % bolus 500 mL (500 mL Intravenous New Bag 4/3/25 0130)   clopidogrel (PLAVIX) tablet 300 mg (300 mg Oral Given 4/3/25 0125)       ED Risk Strat Scores   HEART Risk Score      Flowsheet Row Most Recent Value   Heart Score Risk Calculator    History 0 Filed at: 04/04/2025 0651   ECG 1 Filed at: 04/04/2025 0651   Age 2 Filed at: 04/04/2025 0651   Risk Factors 1 Filed at: 04/04/2025 0651   Troponin 0 Filed at: 04/04/2025 0651   HEART Score 4 Filed at: 04/04/2025 0651          HEART Risk Score      Flowsheet Row Most Recent Value   Heart Score Risk Calculator    History 0 Filed at: 04/04/2025 0651   ECG 1 Filed at: 04/04/2025 0651   Age 2 Filed at: 04/04/2025 0651   Risk Factors 1 Filed at: 04/04/2025 0651   Troponin 0 Filed at: 04/04/2025 0651   HEART Score 4 Filed at: 04/04/2025 0651           Stroke Assessment       Row Name 04/03/25 0105             NIH Stroke Scale    Interval --      Level of Consciousness (1a.) 0      LOC Questions (1b.) 1      LOC Commands (1c.) 1      Best Gaze (2.) 0      Visual (3.) 0      Facial Palsy (4.) 1      Motor Arm, Left (5a.) 0      Motor Arm, Right (5b.) 0      Motor Leg, Left (6a.) 1      Motor Leg, Right (6b.) 0      Limb Ataxia (7.) 0      Sensory (8.) 1      Best Language (9.) 1      Dysarthria (10.) 0      Extinction and Inattention (11.) (Formerly Neglect) 0      Total 6                                      SBIRT 22yo+      Flowsheet Row Most Recent Value   Initial Alcohol Screen: US AUDIT-C     1. How often do you have a drink containing alcohol? 0 Filed at: 04/02/2025 2214   2. How many drinks containing alcohol do you have on a typical day you are drinking?  0 Filed at: 04/02/2025 2214   3a. Male UNDER 65: How often do you have five or more drinks on one occasion? 0 Filed at: 04/02/2025 2214   Audit-C Score 0 Filed at: 04/02/2025 2214   JAYLON: How many  "times in the past year have you...    Used an illegal drug or used a prescription medication for non-medical reasons? Never Filed at: 04/02/2025 4494                            History of Present Illness       Chief Complaint   Patient presents with    CVA/TIA-like Symptoms     Pt presents to ER from home w/ stroke like symptoms, LKW was at 1pm today,  Slurred/Expression Aphasia started at 1 pm , after patients nap, patient began to not respond to Family like normal. Pt had right sided droop, and left side weakness. Family states\" Patient is normally weak on the left side since his previous stroke.\"       History reviewed. No pertinent past medical history.   History reviewed. No pertinent surgical history.   History reviewed. No pertinent family history.   Social History     Tobacco Use    Smoking status: Never    Smokeless tobacco: Never   Vaping Use    Vaping status: Never Used   Substance Use Topics    Alcohol use: Never    Drug use: Never      E-Cigarette/Vaping    E-Cigarette Use Never User       E-Cigarette/Vaping Substances    Nicotine No     THC No     CBD No     Flavoring No     Other No     Unknown No       I have reviewed and agree with the history as documented.     The patient is a 84 y.o. male with a history of afib and cognitive decline who presents to [facility] with a chief complaint of stroke-like symptoms. Per family symptoms began around 1pm with trouble walking, confusion, difficulty with word finding and then they report the patient had slight improvement symptoms but not resolution and then took a nap and woke up around 6pm with worsening of his symptoms. Associated symptoms include right sided facial droop, confusion, decreased speech, left sided weakness. Symptoms are aggravated with none noted and alleviating factors include none noted. Family reports no noticed infectious symptoms, they deny fever, falls, trauma, cough, sputum, hemoptysis, shortness of breath, " hematochezia, hematemesis, hematuria, dysuria, frequency, urgency, hesitancy, nausea, vomiting, diarrhea.  No other reported symptoms at this time.  Patient family denies allergies to anything  Per family patient had symptoms of trouble word-finding, trouble walking and right sided weakness  Patient needs help with ADLs at baseline but family reports he is in relatively good physical shape.   Patient unable to state where he is, the month, or his age. He is able to answer certain yes or no questions.  Last known well 1pm - out of tnk window   NIHSS 6 although difficult with history of dementia with poor memory. Most of the history was obtained from the family bedside      History provided by:  Relative   used: No    CVA/TIA-like Symptoms  Presenting symptoms: weakness    Presenting symptoms: no headaches    Associated symptoms: no chest pain, no dizziness, no fever, no seizures and no vomiting        Review of Systems   Unable to perform ROS: Dementia   Constitutional:  Negative for chills and fever.   HENT:  Negative for ear pain and sore throat.    Eyes:  Negative for pain and visual disturbance.   Respiratory:  Negative for cough and shortness of breath.    Cardiovascular:  Negative for chest pain and palpitations.   Gastrointestinal:  Negative for abdominal pain and vomiting.   Genitourinary:  Negative for dysuria and hematuria.   Musculoskeletal:  Negative for arthralgias and back pain.   Skin:  Negative for color change and rash.   Neurological:  Positive for facial asymmetry, speech difficulty and weakness. Negative for dizziness, tremors, seizures, syncope, light-headedness, numbness and headaches.   All other systems reviewed and are negative.          Objective       ED Triage Vitals   Temperature Pulse Blood Pressure Respirations SpO2 Patient Position - Orthostatic VS   04/02/25 2217 04/02/25 2215 04/02/25 2215 04/02/25 2215 04/02/25 2215 04/02/25 2216   98.2 °F (36.8 °C) 73 (!) 179/92  18 99 % Lying      Temp Source Heart Rate Source BP Location FiO2 (%) Pain Score    04/02/25 2217 04/02/25 2230 04/02/25 2216 -- 04/03/25 1303    Oral Monitor Left arm  No Pain      Vitals      Date and Time Temp Pulse SpO2 Resp BP Pain Score FACES Pain Rating User   04/03/25 1327 -- 68 94 % -- 118/94 -- -- YC   04/03/25 1325 -- 72 97 % -- 140/78 -- -- YC   04/03/25 1319 -- 65 99 % -- 118/94 -- -- DII   04/03/25 1319 -- -- -- -- -- No Pain -- RV   04/03/25 1303 -- -- -- -- -- No Pain -- KF   04/03/25 1206 97.4 °F (36.3 °C) 67 98 % 17 171/97 -- -- DII   04/03/25 1010 98 °F (36.7 °C) 74 98 % 17 175/110 -- -- DII   04/03/25 1000 98 °F (36.7 °C) -- -- 17 175/110 -- -- AP   04/03/25 0806 97.2 °F (36.2 °C) 78 99 % 18 165/103 -- -- DII   04/03/25 0800 97.2 °F (36.2 °C) -- -- 18 165/103 -- -- AP   04/03/25 0700 97.2 °F (36.2 °C) -- -- 18 165/103 -- -- AP   04/03/25 0600 97.2 °F (36.2 °C) 65 -- 16 168/95 -- -- SD   04/03/25 0557 97.2 °F (36.2 °C) 72 98 % -- 168/95 -- -- DII   04/03/25 0500 96.5 °F (35.8 °C) 64 -- 16 106/70 -- -- SD   04/03/25 0400 97.1 °F (36.2 °C) -- -- -- 158/133 -- -- PA   04/03/25 0400 -- 72 100 % 16 -- -- -- VR   04/03/25 0300 97.5 °F (36.4 °C) 71 100 % -- 177/90 -- 0 SD   04/03/25 0200 -- 71 99 % 20 156/91 -- -- JT   04/03/25 0100 -- 72 97 % 18 168/78 -- -- JT   04/03/25 0045 -- 70 97 % 17 167/73 -- -- JT   04/03/25 0030 -- 71 98 % 19 159/74 -- -- JT   04/03/25 0015 -- 71 99 % 16 154/88 -- -- JT   04/03/25 0000 -- 72 96 % 19 154/82 -- -- JT   04/02/25 2345 -- 71 99 % 19 162/88 -- -- JT   04/02/25 2330 -- 69 95 % 17 136/70 -- -- JT   04/02/25 2315 -- 65 95 % 19 147/73 -- -- JT   04/02/25 2300 -- 71 98 % 14 145/67 -- -- AM   04/02/25 2245 -- 68 98 % 20 166/79 -- -- AM   04/02/25 2230 -- 76 98 % 18 158/86 -- -- AM   04/02/25 2226 -- 72 99 % 17 179/92 -- -- AM   04/02/25 2217 98.2 °F (36.8 °C) -- -- -- -- -- -- AM   04/02/25 2216 -- 78 98 % 18 179/92 -- -- AM   04/02/25 2215 -- 73 99 % 18 179/92 -- -- AM             Physical Exam  Vitals reviewed.   Constitutional:       General: He is not in acute distress.     Appearance: Normal appearance. He is not ill-appearing or toxic-appearing.   HENT:      Head: Normocephalic.      Right Ear: Tympanic membrane, ear canal and external ear normal.      Left Ear: Tympanic membrane, ear canal and external ear normal.      Nose: Nose normal.      Mouth/Throat:      Mouth: Mucous membranes are moist.      Pharynx: Oropharynx is clear.   Eyes:      General: Gaze aligned appropriately.      Conjunctiva/sclera: Conjunctivae normal.      Pupils: Pupils are equal, round, and reactive to light.      Comments: Left pterygium    Neck:      Vascular: No carotid bruit.   Cardiovascular:      Rate and Rhythm: Normal rate.      Pulses: Normal pulses.   Pulmonary:      Effort: Pulmonary effort is normal. No respiratory distress.      Breath sounds: No stridor. No wheezing or rhonchi.   Abdominal:      General: Abdomen is flat. Bowel sounds are normal.      Tenderness: There is no abdominal tenderness.   Musculoskeletal:      Cervical back: Normal range of motion. No tenderness.      Right lower leg: No edema.      Left lower leg: No edema.   Skin:     General: Skin is warm and dry.      Capillary Refill: Capillary refill takes less than 2 seconds.      Coloration: Skin is not jaundiced or pale.      Findings: No bruising, erythema or rash.   Neurological:      Mental Status: He is alert. He is disoriented.      Cranial Nerves: Facial asymmetry present.      Sensory: Sensation is intact.      Motor: Weakness, abnormal muscle tone and pronator drift present.      Gait: Gait abnormal.      Comments: Decreased left sided  and leg strength. Right sided facial droop         Results Reviewed       Procedure Component Value Units Date/Time    HS Troponin I 4hr [[id number]]  (Normal) Collected: 04/03/25 0534    Lab Status: Final result Specimen: Blood Updated: 04/03/25 0607     hs TnI 4hr 8 ng/L       Delta 4hr hsTnI -2 ng/L     HS Troponin I 2hr [[id number]]  (Normal) Collected: 04/03/25 0023    Lab Status: Final result Specimen: Blood from Arm, Right Updated: 04/03/25 0056     hs TnI 2hr 10 ng/L      Delta 2hr hsTnI 0 ng/L     HS Troponin 0hr (reflex protocol) [[id number]]  (Normal) Collected: 04/02/25 2222    Lab Status: Final result Specimen: Blood from Arm, Right Updated: 04/02/25 2251     hs TnI 0hr 10 ng/L     Basic metabolic panel [[id number]]  (Abnormal) Collected: 04/02/25 2222    Lab Status: Final result Specimen: Blood from Arm, Right Updated: 04/02/25 2248     Sodium 138 mmol/L      Potassium 4.3 mmol/L      Chloride 105 mmol/L      CO2 28 mmol/L      ANION GAP 5 mmol/L      BUN 31 mg/dL      Creatinine 1.23 mg/dL      Glucose 96 mg/dL      Calcium 8.7 mg/dL      eGFR 52 ml/min/1.73sq m     Narrative:      [employer] guidelines for Chronic Kidney Disease (CKD):     Stage 1 with normal or high GFR (GFR > 90 mL/min/1.73 square meters)    Stage 2 Mild CKD (GFR = 60-89 mL/min/1.73 square meters)    Stage 3A Moderate CKD (GFR = 45-59 mL/min/1.73 square meters)    Stage 3B Moderate CKD (GFR = 30-44 mL/min/1.73 square meters)    Stage 4 Severe CKD (GFR = 15-29 mL/min/1.73 square meters)    Stage 5 End Stage CKD (GFR <15 mL/min/1.73 square meters)  Note: GFR calculation is accurate only with a steady state creatinine    Protime-INR [[id number]]  (Normal) Collected: 04/02/25 2222    Lab Status: Final result Specimen: Blood from Arm, Right Updated: 04/02/25 2241     Protime 14.0 seconds      INR 1.01    Narrative:      INR Therapeutic Range    Indication                                             INR Range      Atrial Fibrillation                                               2.0-3.0  Hypercoagulable State                                    2.0.2.3  Left Ventricular Asist Device                            2.0-3.0  Mechanical Heart Valve                                  -    Aortic(with  afib, MI, embolism, HF, LA enlargement,    and/or coagulopathy)                                     2.0-3.0 (2.5-3.5)     Mitral                                                             2.5-3.5  Prosthetic/Bioprosthetic Heart Valve               2.0-3.0  Venous thromboembolism (VTE: VT, PE        2.0-3.0    APTT [[id number]]  (Normal) Collected: 04/02/25 2222    Lab Status: Final result Specimen: Blood from Arm, Right Updated: 04/02/25 2241     PTT 29 seconds     CBC and Platelet [[id number]]  (Normal) Collected: 04/02/25 2222    Lab Status: Final result Specimen: Blood from Arm, Right Updated: 04/02/25 2233     WBC 7.86 Thousand/uL      RBC 4.68 Million/uL      Hemoglobin 14.2 g/dL      Hematocrit 43.3 %      MCV 93 fL      MCH 30.3 pg      MCHC 32.8 g/dL      RDW 12.3 %      Platelets 168 Thousands/uL      MPV 10.7 fL             CTA stroke alert (head/neck)   Final Interpretation by Korey Adame DO (04/02 2250)      Left M1 occlusion      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      Incidental thyroid nodule(s) for which nonemergent thyroid ultrasound is recommended. However, consider patient's age and clinical status to assess need for follow-up.         Message sent through [employer] at 10:39PM on 4/2/25. Discussed this study with Dr. Damian on 4/2/2025 10:48 PM.            Workstation performed: [id number]         CT stroke alert brain   Final Interpretation by Korey Adame DO (04/02 2250)      No acute intracranial abnormality. Chronic ischemic changes.         Message sent through [employer] at 10:39PM on 4/2/25. Discussed this study with Dr. Damian on 4/2/2025 10:48 PM.      Workstation performed: [id number]             Procedures    ED Medication and Procedure Management   Prior to Admission Medications   Prescriptions Last Dose Informant Patient Reported? Taking?   Misc Natural Products ([employer]) CAPS Past Week  Yes Yes   Sig: Take 1 capsule by mouth  in the morning   aspirin 81 mg chewable tablet Past Week  Yes Yes   Sig: Chew 81 mg daily      Facility-Administered Medications: None     Current Discharge Medication List        CONTINUE these medications which have NOT CHANGED    Details   aspirin 81 mg chewable tablet Chew 81 mg daily      Misc Natural Products ([employer]) CAPS Take 1 capsule by mouth in the morning           No discharge procedures on file.  ED SEPSIS DOCUMENTATION   Time reflects when diagnosis was documented in both MDM as applicable and the Disposition within this note       Time User Action Codes Description Comment    4/2/2025 10:15 PM Behzad Steel [R29.90] Stroke-like symptoms     4/3/2025  1:22 AM Behzad Steel [I63.9] CVA (cerebral vascular accident) ([facility])     4/3/2025  3:31 AM Chiqui Gutierres [F03.90] Dementia ([facility])                  Behzad Steel PA-C  04/04/25 0731

## 2025-04-03 NOTE — ASSESSMENT & PLAN NOTE
Patient presents to the ED with strokelike symptoms, right facial droop, difficulty word finding and weakness with difficulty ambulating.  Patient received 3 aspirins at 81 mg at home prior to arriving to ED.  Stroke alert was called.  ED provider discussed case with neurology and endovascular. Currently patient is not a good candidate for endovascular thrombectomy, and also recommendation stroke pathway and MRI.  ED provider discussed decisions from neurology and endovascular with patient's family and POA.  And family and POA are in agreement with not attempting endovascular procedure at this time due to patient's risk with age.  ED gave Plavix and normal saline bolus.  Blood pressure elevated upon arrival to ED at 179/92, HR 73.  Troponin-10-10, pending third troponin.  Delta 0.  EKG-atrial fibrillation.  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   Incidental finding-Multinodular left lobe thyroid gland, with nodules measuring up to 2.7 cm   POA notified.  CT stroke brain-negative for anything acute.    Plan  Allow permissive hypertension.  Start aspirin, hold off on atorvastatin per Son patients POA. To honor patients wishes patients son his POA, states he will hold off on medications. Risks explained and son states he understands risks but would like to follow fathers wishes.   Telemetry monitor.  Echo ordered.  Son POA would like to hold off on ordering MRI until speaking with neurology regarding plan of care for patient.   Lipid and hemoglobin A1c ordered.  Stroke pathway.  Speech eval prior to p.o. diet.  Aspiration precautions.  Neurology consult appreciated.

## 2025-04-03 NOTE — ASSESSMENT & PLAN NOTE
Patient has history of atrial fibrillation. Patient was placed on medications in the past ans refused to continue taking them. To honor patients wishes patients son his POA, states he will hold off on any rate control medications and blood thinners. Risks explained and son states he understands risks but would like to follow fathers wishes.   EKG-atrial fibrillation.  Telemetry ordered.

## 2025-04-03 NOTE — OCCUPATIONAL THERAPY NOTE
"          Occupational Therapy Evaluation        Patient Name: Artie Zavala  Today's Date: 4/3/2025         04/03/25 1319   OT Last Visit   OT Visit Date 04/03/25   Note Type   Note type Evaluation   Pain Assessment   Pain Assessment Tool 0-10   Pain Score No Pain   Restrictions/Precautions   Weight Bearing Precautions Per Order No   Braces or Orthoses   (none per pt's daughter)   Other Precautions Cognitive;Bed Alarm;Telemetry;Fall Risk;Aspiration   Home Living   Type of Home House   Home Layout Multi-level;Stairs to enter without rails;Bed/bath upstairs;Performs ADLs on one level  (3 JOHN. 5 steps up to level bedroom/bathroom)   Bathroom Shower/Tub Tub/shower unit   Bathroom Toilet Standard   Bathroom Equipment Shower chair   Bathroom Accessibility Accessible   Home Equipment Cane;Wheelchair-manual   Additional Comments cane used prn, w/c used for distances   Prior Function   Level of [place] Independent with functional mobility;Needs assistance with ADLs;Needs assistance with IADLS  (requires cues for ADLs)   Lives With Daughter   Receives Help From Family   IADLs Family/Friend/Other provides transportation;Family/Friend/Other provides meals;Family/Friend/Other provides medication management   Falls in the last 6 months 0   Vocational Retired  (farmer- cauliflower/ cabbage)   Lifestyle   Autonomy pt's daughter provided home s/u information and PLOF information as pt poor historian. Patient lives with family in a multi level house, 3 JOHN. 5 steps up to level bedroom/bathroom. Patient was ambulating without an AD, using SPC PRN and w/c for longer distances. Patient required some assistance for ADLs, and IADLs.   Reciprocal Relationships Supportive Family   Service to Others retired Farmer   Intrinsic Gratification \"enjoys taking walks and being outdoors\"   General   Family/Caregiver Present Yes  (Son and daughter present)   ADL   Where Assessed Edge of bed   Eating Assistance 2  Maximal Assistance "   Grooming Assistance 2  Maximal Assistance   UB Bathing Assistance 2  Maximal Assistance   LB Bathing Assistance 1  Total Assistance   UB Dressing Assistance 1  Total Assistance   LB Dressing Assistance 1  Total Assistance   Toileting Assistance  1  Total Assistance   Functional Assistance 4  Minimal Assistance   Bed Mobility   Supine to Sit 3  Moderate assistance   Additional items Assist x 2;HOB elevated;Bedrails;Increased time required;Verbal cues;LE management   Sit to Supine 2  Maximal assistance   Additional items Assist x 2;HOB elevated;Bedrails;Increased time required;Verbal cues;LE management   Transfers   Sit to Stand 4  Minimal assistance   Additional items Assist x 2;Armrests;Increased time required;Verbal cues   Stand to Sit 4  Minimal assistance   Additional items Assist x 2;Armrests;Increased time required;Verbal cues   Additional Comments During sit to stand attempt, pt with episode of urinary incontinence, and pt subsequently returned to seated position. Pt noted to have change in mental status, eyes closed, clammy skin, notable drooling. Pt's RN immediately notified and she called a rapid response. Pt was returned to supine. Vitals taken upon return /94, 65bpm, 99% on RA.   Balance   Static Sitting Fair -   Dynamic Sitting Poor +   Static Standing Poor   Activity Tolerance   Activity Tolerance Treatment limited secondary to medical complications (Comment);Patient limited by fatigue   Medical Staff Made Aware Pt seen as a co-eval with PT due to the patient's co-morbidities, clinically unstable presentation, and present impairments which are a regression from the patient's baseline.   Nurse Made Aware HAIM ROBERTS Assessment   RUE Assessment X  (limited functional use of uppers on command/ noted spontaneous use of uppers reaching and holding onto grab bar, gown and walker handle.)   LUE Assessment   LUE Assessment X  (limited functional use of uppers on command/ noted spontaneous use of  uppers reaching and holding onto grab bar, gown and walker handle.)   Hand Function   Gross Motor Coordination Impaired   Fine Motor Coordination Impaired   Vision-Basic Assessment   Current Vision Other (Comment)  (pt's dtr reporting pt with h/o wearing glasses, however not wearing anymore per eye doctor rec)   Cognition   Overall Cognitive Status Impaired   Arousal/Participation Alert;Cooperative   Attention Difficulty attending to directions   Orientation Level Unable to assess   Memory Unable to assess   Following Commands Follows one step commands inconsistently   Assessment   Limitation Decreased ADL status;Decreased UE ROM;Decreased UE strength;Decreased Safe judgement during ADL;Decreased cognition;Decreased endurance;Decreased fine motor control;Decreased self-care trans;Decreased high-level ADLs   Prognosis Fair   Assessment Patient is a 84 y.o. male seen for OT evaluation s/p admit to [facility] on 4/2/2025 w/Stroke-like symptoms. Commorbidities affecting patient's functional performance at time of assessment include:  stroke like symptoms, ambulatory dysfunction, thyroid nodule, HTN, a fib, dementia.  Orders placed for OT evaluation and treatment.  Performed at least two patient identifiers during session including name and wristband.  Prior to admission, pt's daughter provided home s/u information and PLOF information as pt poor historian. Patient lives with family in a multi level house, 3 JOHN. 5 steps up to level bedroom/bathroom. Patient was ambulating without an AD, using SPC PRN and w/c for longer distances. Patient required some assistance for ADLs, and IADLs.  Personal factors affecting patient at time of initial evaluation include: difficulty performing ADLs and difficulty performing IADLs. Upon evaluation, patient requires maximal assist for UB ADLs, total assist for LB ADLs.   During sit to stand attempt, pt with episode of urinary incontinence, and pt subsequently returned to  seated position. Pt noted to have change in mental status, eyes closed, clammy skin, notable drooling. Pt's RN immediately notified and she called a rapid response. Pt was returned to supine. Vitals taken upon return /94, 65bpm, 99% on RA.Occupational performance is affected by the following deficits: orientation, attention span, decreased functional use of BUEs, in hand manipulation deficit with impaired reach, grasp and coordination, degenerative arthritic joint changes, impaired gross motor coordination, impaired fine motor coordination, dynamic sit/ stand balance deficit with poor standing tolerance time for self care and functional mobility, decreased activity tolerance, impaired memory, impaired problem solving, decreased safety awareness, and postural control and postural alignment deficit, requiring external assistance to complete transitional movements.  Patient to benefit from continued Occupational Therapy treatment while in the hospital to address deficits as defined above and maximize level of functional independence with ADLs and functional mobility. Occupational Performance areas to address include: eating, grooming , bathing/ shower, dressing, toilet hygiene, transfer to all surfaces, functional mobility, and IADLs: safety procedures. From OT standpoint, recommendation at time of d/c would be Level 1.   Plan   Treatment Interventions ADL retraining;Functional transfer training;UE strengthening/ROM;Endurance training;Patient/family training;Equipment evaluation/education;Compensatory technique education;Continued evaluation;Energy conservation;Activityengagement   Goal Expiration Date 04/17/25   OT Frequency 3-5x/wk   Discharge Recommendation   Rehab Resource Intensity Level, OT I (Maximum Resource Intensity)   AM-PAC Daily Activity Inpatient   Lower Body Dressing 1   Bathing 1   Toileting 1   Upper Body Dressing 2   Grooming 2   Eating 2   Daily Activity Raw Score 9   AM-PAC Applied Cognition  Inpatient   Following a Speech/Presentation 2   Understanding Ordinary Conversation 2   Taking Medications 1   Remembering Where Things Are Placed or Put Away 1   Remembering List of 4-5 Errands 1   Taking Care of Complicated Tasks 1   Applied Cognition Raw Score 8   Applied Cognition Standardized Score 19.32   Barthel Index   Feeding 0   Bathing 0   Grooming Score 0   Dressing Score 0   Bladder Score 0   Bowels Score 5   Toilet Use Score 5   Transfers (Bed/Chair) Score 5   Mobility (Level Surface) Score 0   Stairs Score 0   Barthel Index Score 15         Occupational Therapy goals:  1- Patient will complete self feeding task with set up assist  2- Patient will complete rolling to R/L with use of side rail and min assist x1.   3- Patient will increase OOB/ sitting tolerance to 2-4 hours per day for increased participation in self care and leisure tasks with no s/s of exertion.  4- Patient will verbalize and demonstrate weight shifting technique in bed and sitting position with 10% verbal cues  5- Patient will complete grooming task with set up assist.   6-Patient/ Family  will demonstrate competency with UE Home Exercise Program.  7- Patient will complete Interest checklist to explore participation in play/ leisure tasks for increased satisfaction and quality of life.

## 2025-04-03 NOTE — ASSESSMENT & PLAN NOTE
Patient was also experiencing difficulty ambulating.  Per family patient has dementia and needs help with ADLs at his baseline.  PT/OT evaluation and treat.  Fall precautions.

## 2025-04-03 NOTE — ASSESSMENT & PLAN NOTE
"CTA stroke alert (head/neck): \"Incidental thyroid nodule(s) for which nonemergent thyroid ultrasound is recommended. However, consider patient's age and clinical status to assess need for follow-up\"   "

## 2025-04-03 NOTE — PHYSICAL THERAPY NOTE
Physical Therapy Evaluation     Patient's Name: Artie Zavala    Admitting Diagnosis  TIA (transient ischemic attack) [G45.9]  CVA (cerebral vascular accident) (HCC) [I63.9]  Stroke-like symptoms [R29.90]    Problem List  Patient Active Problem List   Diagnosis    Stroke-like symptoms    Ambulatory dysfunction    Thyroid nodule    Hypertension    Atrial fibrillation (HCC)    Dementia (HCC)       Past Medical History  History reviewed. No pertinent past medical history.    Past Surgical History  History reviewed. No pertinent surgical history.         04/03/25 1303   PT Last Visit   PT Visit Date 04/03/25   Note Type   Note type Evaluation   Pain Assessment   Pain Assessment Tool 0-10   Pain Score No Pain   Restrictions/Precautions   Weight Bearing Precautions Per Order No   Braces or Orthoses   (none per pt's daughter)   Other Precautions Cognitive;Bed Alarm;Telemetry;Fall Risk;Aspiration   Home Living   Type of Home House   Home Layout Multi-level;Stairs to enter without rails;Bed/bath upstairs;Performs ADLs on one level  (3 JOHN. 5 steps up to level bedroom/bathroom)   Bathroom Shower/Tub Tub/shower unit   Bathroom Toilet Standard   Bathroom Equipment Shower chair   Bathroom Accessibility Accessible   Home Equipment Cane;Wheelchair-manual   Additional Comments cane used prn, w/c used for distances   Prior Function   Level of [place] Independent with functional mobility;Needs assistance with ADLs;Needs assistance with IADLS  (requires cues for ADLs)   Lives With Daughter   Receives Help From Family   IADLs Family/Friend/Other provides transportation;Family/Friend/Other provides meals;Family/Friend/Other provides medication management   Falls in the last 6 months 0   Vocational Retired  (farmer- cauliflower/ cabbage)   Comments pt's daughter provided home s/u information and PLOF information as pt poor historian   General   Family/Caregiver Present Yes   Cognition   Overall Cognitive Status Impaired    Arousal/Participation Arousable   Orientation Level Unable to assess   Following Commands Follows one step commands inconsistently   Comments pt's speech unintelligible   RLE Assessment   RLE Assessment   (SHELLY to formally evaluate d/t cognitive deficits, recommend continued assessment next session)   LLE Assessment   LLE Assessment   (SHELLY to formally evaluate d/t cognitive deficits, recommend continued assessment)   Vision-Basic Assessment   Current Vision   (pt's dtr reporting pt with h/o wearing glasses, however not wearing anymore per eye doctor rec)   Coordination   Movements are Fluid and Coordinated   (SHELLY to formally evaluate d/t cognitive deficits, recommend continued assessment next session)   Sensation   (SHELLY to formally evaluate d/t cognitive deficits, recommend continued assessment next session)   Bed Mobility   Supine to Sit 3  Moderate assistance   Additional items Assist x 2;HOB elevated;Bedrails;Increased time required;Verbal cues;LE management   Sit to Supine 2  Maximal assistance   Additional items Assist x 2;HOB elevated;Bedrails;Increased time required;Verbal cues;LE management   Transfers   Sit to Stand 4  Minimal assistance   Additional items Assist x 2;Armrests;Increased time required;Verbal cues   Stand to Sit 4  Minimal assistance   Additional items Assist x 2;Armrests;Increased time required;Verbal cues   Additional Comments During sit to stand attempt, pt with episode of urinary incontinence, and pt subsequently returned to seated position. Pt noted to have change in mental status, eyes closed, clammy skin, notable drooling. Pt's RN immediately notified and she called a rapid response. Pt was returned to supine. Vitals taken upon return /94, 65bpm, 99% on RA.   Ambulation/Elevation   Gait pattern Not tested;Not appropriate   Balance   Static Sitting Fair -   Dynamic Sitting Poor +   Static Standing Poor   Dynamic Standing   (DNT)   Endurance Deficit   Endurance Deficit Yes    Activity Tolerance   Activity Tolerance Treatment limited secondary to medical complications (Comment);Patient limited by fatigue   Medical Staff Made Aware Pt seen as a co-eval with OT due to the patient's co-morbidities, clinically unstable presentation, and present impairments which are a regression from the patient's baseline.   Nurse Made Aware HAIM Summers   Assessment   Prognosis Guarded   Problem List Decreased strength;Decreased endurance;Impaired balance;Decreased mobility;Decreased cognition   Assessment Pt is 84 y.o. male seen for PT evaluation on 4/3/2025 s/p admit to [facility] on 4/2/2025 w/ Stroke-like symptoms. PT was consulted to assess pt's functional mobility and d/c needs. Order placed for PT eval and tx. PTA, pt resides with daughter in multi level home, ambulates with cane prn, w/c for longer distances. At time of eval, pt requiring mod-max assist x2 for bed mobility, min assist x2 for sit to stand transfer. During sit to stand attempt, pt with episode of urinary incontinence, and pt subsequently returned to seated position. Pt noted to have change in mental status, eyes closed, clammy skin, notable drooling. Pt's RN immediately notified and she called a rapid response. Pt was returned to supine. Vitals taken upon return /94, 65bpm, 99% on RA. Upon evaluation, pt presenting with impaired functional mobility d/t decreased strength, decreased endurance, impaired balance, decreased mobility, decreased cognition, and activity intolerance. Pertinent PMHx and current co-morbidities affecting pt's physical performance at time of assessment include: stroke like symptoms, ambulatory dysfunction, thyroid nodule, HTN, a fib, dementia. Personal factors affecting pt at time of eval include: inaccessible home environment, inability to ambulate household distances, inability to navigate level surfaces w/o external assistance, decreased cognition, and decreased initiation and engagement. The  following objective measures performed on IE also reveal limitations: Barthel Index: 15/100, Modified [place]: 5 (severe disability), and AM-PAC 6-Clicks: 6/24. Pt's clinical presentation is currently unstable/unpredictable seen in pt's presentation of advanced age, abnormal lab value(s), ongoing medical assessment, and on telemetry monitoring. Overall, pt's rehab potential and prognosis to return to PLOF is guarded as impacted by objective findings, warranting pt to receive further skilled PT interventions to address identified impairments, activity limitation(s), and participation restriction(s). Pt to benefit from continued PT tx to address deficits as defined above and maximize level of functional independent mobility. From PT/mobility standpoint, recommend level 2, moderate resource intensity in order to facilitate return to PLOF.   Barriers to Discharge Inaccessible home environment   Goals   Patient Goals none expressed   STG Expiration Date 04/13/25   Short Term Goal #1 In 7-10 days: Increase bilateral LE strength 1/2 grade to facilitate independent mobility, Perform all bed mobility tasks with min A of 1 to decrease caregiver burden, Perform all transfers with min A of 1 to improve independence, Increase static and dynamic sitting and static and dynamic standing balance 1/2 grade to decrease risk for falls, Improve Barthel Index score to 30 or greater to facilitate independence, and PT to see and establish goals for ambulation, elevations/stairs, ambulatory balance when appropriate   PT Treatment Day 0   Plan   Treatment/Interventions Functional transfer training;LE strengthening/ROM;Therapeutic exercise;Endurance training;Patient/family training;Equipment eval/education;Bed mobility;Compensatory technique education;Continued evaluation;Spoke to nursing;OT;Family   PT Frequency 3-5x/wk   Discharge Recommendation   Rehab Resource Intensity Level, PT II (Moderate Resource Intensity)   AM-PAC Basic Mobility  Inpatient   Turning in Flat Bed Without Bedrails 1   Lying on Back to Sitting on Edge of Flat Bed Without Bedrails 1   Moving Bed to Chair 1   Standing Up From Chair Using Arms 1   Walk in Room 1   Climb 3-5 Stairs With Railing 1   Basic Mobility Inpatient Raw Score 6   Turning Head Towards Sound 2   Follow Simple Instructions 1   Low Function Basic Mobility Raw Score  9   Low Function Basic Mobility Standardized Score  12.55   [facility] Highest Level Of Mobility   [facility] Goal 2: Bed activities/Dependent transfer   [facility] Achieved 3: Sit at edge of bed   Modified Bert Scale   Modified Ideal Scale 5   Barthel Index   Feeding 0   Bathing 0   Grooming Score 0   Dressing Score 0   Bladder Score 0   Bowels Score 5   Toilet Use Score 5   Transfers (Bed/Chair) Score 5   Mobility (Level Surface) Score 0   Stairs Score 0   Barthel Index Score 15       Letty Cha, PT

## 2025-04-03 NOTE — ED NOTES
Patient 02 dropped to 84% on room air while sleeping. Patient placed in 3L NC. Provider made aware.      Key Palma RN  04/03/25 0149

## 2025-04-03 NOTE — PLAN OF CARE
Problem: PAIN - ADULT  Goal: Verbalizes/displays adequate comfort level or baseline comfort level  Description: Interventions:- Encourage patient to monitor pain and request assistance- Assess pain using appropriate pain scale- Administer analgesics based on type and severity of pain and evaluate response- Implement non-pharmacological measures as appropriate and evaluate response- Consider cultural and social influences on pain and pain management- Notify physician/advanced practitioner if interventions unsuccessful or patient reports new pain  4/3/2025 1432 by Jeny Rahman LPN  Outcome: Progressing  4/3/2025 1432 by Jeny Rahman LPN  Outcome: Progressing     Problem: INFECTION - ADULT  Goal: Absence or prevention of progression during hospitalization  Description: INTERVENTIONS:- Assess and monitor for signs and symptoms of infection- Monitor lab/diagnostic results- Monitor all insertion sites, i.e. indwelling lines, tubes, and drains- Monitor endotracheal if appropriate and nasal secretions for changes in amount and color- [place] appropriate cooling/warming therapies per order- Administer medications as ordered- Instruct and encourage patient and family to use good hand hygiene technique- Identify and instruct in appropriate isolation precautions for identified infection/condition  4/3/2025 1432 by Jeny Rahman LPN  Outcome: Progressing  4/3/2025 1432 by Jeny Rahman LPN  Outcome: Progressing  Goal: Absence of fever/infection during neutropenic period  Description: INTERVENTIONS:- Monitor WBC  4/3/2025 1432 by Jeny Rahman LPN  Outcome: Progressing  4/3/2025 1432 by Jeny Rahman LPN  Outcome: Progressing     Problem: SAFETY ADULT  Goal: Patient will remain free of falls  Description: INTERVENTIONS:- Educate patient/family on patient safety including physical limitations- Instruct patient to call for assistance with activity - Consult OT/PT to assist with strengthening/mobility - Keep Call  bell within reach- Keep bed low and locked with side rails adjusted as appropriate- Keep care items and personal belongings within reach- Initiate and maintain comfort rounds- Make Fall Risk Sign visible to staff- Offer Toileting every 2 Hours, in advance of need- Initiate/Maintain bed alarm- Obtain necessary fall risk management equipment: - Apply yellow socks and bracelet for high fall risk patients- Consider moving patient to room near nurses station  4/3/2025 1432 by Jeny Rahman LPN  Outcome: Progressing  4/3/2025 1432 by Jeny Rahman LPN  Outcome: Progressing  Goal: Maintain or return to baseline ADL function  Description: INTERVENTIONS:-  Assess patient's ability to carry out ADLs; assess patient's baseline for ADL function and identify physical deficits which impact ability to perform ADLs (bathing, care of mouth/teeth, toileting, grooming, dressing, etc.)- Assess/evaluate cause of self-care deficits - Assess range of motion- Assess patient's mobility; develop plan if impaired- Assess patient's need for assistive devices and provide as appropriate- Encourage maximum independence but intervene and supervise when necessary- Involve family in performance of ADLs- Assess for home care needs following discharge - Consider OT consult to assist with ADL evaluation and planning for discharge- Provide patient education as appropriate  4/3/2025 1432 by Jeny Rahman LPN  Outcome: Progressing  4/3/2025 1432 by Jeny Rahman LPN  Outcome: Progressing  Goal: Maintains/Returns to pre admission functional level  Description: INTERVENTIONS:- Perform AM-PAC 6 Click Basic Mobility/ Daily Activity assessment daily.- Set and communicate daily mobility goal to care team and patient/family/caregiver. - Collaborate with rehabilitation services on mobility goals if consulted- Perform Range of Motion 2 times a day.- Reposition patient every 2 hours.- Dangle patient 2 times a day- Stand patient 2 times a day- Ambulate  patient 2 times a day- Out of bed to chair 2 times a day - Out of bed for meals 2 times a day- Out of bed for toileting- Record patient progress and toleration of activity level   4/3/2025 1432 by Jeny Rahman LPN  Outcome: Progressing  4/3/2025 1432 by Jeny Rahman LPN  Outcome: Progressing     Problem: DISCHARGE PLANNING  Goal: Discharge to home or other facility with appropriate resources  Description: INTERVENTIONS:- Identify barriers to discharge w/patient and caregiver- Arrange for needed discharge resources and transportation as appropriate- Identify discharge learning needs (meds, wound care, etc.)- Arrange for interpretive services to assist at discharge as needed- Refer to Case Management Department for coordinating discharge planning if the patient needs post-hospital services based on physician/advanced practitioner order or complex needs related to functional status, cognitive ability, or social support system  4/3/2025 1432 by Jeny Rahman LPN  Outcome: Progressing  4/3/2025 1432 by Jeny Rahman LPN  Outcome: Progressing     Problem: Knowledge Deficit  Goal: Patient/family/caregiver demonstrates understanding of disease process, treatment plan, medications, and discharge instructions  Description: Complete learning assessment and assess knowledge base.Interventions:- Provide teaching at level of understanding- Provide teaching via preferred learning methods  4/3/2025 1432 by Jeny Rahman LPN  Outcome: Progressing  4/3/2025 1432 by Jeny Rahman LPN  Outcome: Progressing     Problem: Prexisting or High Potential for Compromised Skin Integrity  Goal: Skin integrity is maintained or improved  Description: INTERVENTIONS:- Identify patients at risk for skin breakdown- Assess and monitor skin integrity- Assess and monitor nutrition and hydration status- Monitor labs - Assess for incontinence - Turn and reposition patient- Assist with mobility/ambulation- Relieve pressure over bony  prominences- Avoid friction and shearing- Provide appropriate hygiene as needed including keeping skin clean and dry- Evaluate need for skin moisturizer/barrier cream- Collaborate with interdisciplinary team - Patient/family teaching- Consider wound care consult   4/3/2025 1432 by Jeny Rahman LPN  Outcome: Progressing  4/3/2025 1432 by Jeny Rahman LPN  Outcome: Progressing

## 2025-04-03 NOTE — H&P
H&P - Hospitalist   Name: Artie Zavala 84 y.o. male I MRN: [id number]  Unit/Bed#: 2 E 278-01 I Date of Admission: 4/2/2025   Date of Service: 4/3/2025 I Hospital Day: 0     Assessment & Plan  Stroke-like symptoms  Patient presents to the ED with strokelike symptoms, right facial droop, difficulty word finding and weakness with difficulty ambulating.  Patient received 3 aspirins at 81 mg at home prior to arriving to ED.  Stroke alert was called.  ED provider discussed case with neurology and endovascular. Currently patient is not a good candidate for endovascular thrombectomy, and also recommendation stroke pathway and MRI.  ED provider discussed decisions from neurology and endovascular with patient's family and POA.  And family and POA are in agreement with not attempting endovascular procedure at this time due to patient's risk with age.  ED gave Plavix and normal saline bolus.  Blood pressure elevated upon arrival to ED at 179/92, HR 73.  Troponin-10-10, pending third troponin.  Delta 0.  EKG-atrial fibrillation.  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   Incidental finding-Multinodular left lobe thyroid gland, with nodules measuring up to 2.7 cm   POA notified.  CT stroke brain-negative for anything acute.    Plan  Allow permissive hypertension.  Start aspirin, hold off on atorvastatin per Son patients POA. To honor patients wishes patients son his POA, states he will hold off on medications. Risks explained and son states he understands risks but would like to follow fathers wishes.   Telemetry monitor.  Echo ordered.  Son POA would like to hold off on ordering MRI until speaking with neurology regarding plan of care for patient.   Lipid and hemoglobin A1c ordered.  Stroke pathway.  Speech eval prior to p.o. diet.  Aspiration precautions.  Neurology consult appreciated.    Atrial fibrillation (HCC)  Patient has history of atrial fibrillation.  Patient was placed on medications in the past ans refused to continue taking them. To honor patients wishes patients son his POA, states he will hold off on any rate control medications and blood thinners. Risks explained and son states he understands risks but would like to follow fathers wishes.   EKG-atrial fibrillation.  Telemetry ordered.  Ambulatory dysfunction  Patient was also experiencing difficulty ambulating.  Per family patient has dementia and needs help with ADLs at his baseline.  PT/OT evaluation and treat.  Fall precautions.  Hypertension  Blood pressure elevated upon arrival to ED at 179/92, HR 73.  Current /91, HR 71.  Permissive hypertension.  Monitor BP and HR.  Dementia (HCC)  Delirium precautions.  Gerontology consult appreciated.  Thyroid nodule  Incidental finding-Multinodular left lobe thyroid gland, with nodules measuring up to 2.7 cm.   POA notified, that patient will need to follow-up with a ultrasound in the future.    VTE Pharmacologic Prophylaxis:   High Risk (Score >/= 5) - Pharmacological DVT Prophylaxis Ordered: heparin. Sequential Compression Devices Ordered.  Code Status: Level 3 - DNAR and DNI   Discussion with family: Updated  (son) via phone.    Anticipated Length of Stay: Patient will be admitted on an observation basis with an anticipated length of stay of less than 2 midnights secondary to strokelike symptoms.    History of Present Illness   Chief Complaint: Right facial droop, difficulty word finding and weakness with difficulty ambulating.    Artie Zavala is a 84 y.o. male with a PMH of atrial fibrillation who presents with to the ED with strokelike symptoms, right facial droop, difficulty word finding and weakness with difficulty ambulating.  Patient received 3 aspirins at 81 mg at home prior to arriving to ED.Stroke alert was called. ED provider discussed case with neurology and endovascular. Currently patient is not a good candidate for  endovascular thrombectomy, and also recommendation stroke pathway and MRI.  ED provider discussed decisions from neurology and endovascular with patient's family and POA.  And family and POA are in agreement with not attempting endovascular procedure at this time due to patient's risk with age. Patient has history of atrial fibrillation. Patient was placed on medications in the past ans refused to continue taking them. To honor patients wishes patients son his POA, states he will hold off on any rate control medications and blood thinners. Risks explained and son states he understands risks but would like to follow fathers wishes.     Review of Systems   Constitutional:  Positive for activity change. Negative for chills, fatigue and fever.   HENT:  Negative for congestion, ear pain and sore throat.    Eyes:  Negative for pain and visual disturbance.   Respiratory:  Negative for cough and shortness of breath.    Cardiovascular:  Negative for chest pain and palpitations.   Gastrointestinal:  Negative for abdominal pain and vomiting.   Genitourinary:  Negative for dysuria and hematuria.   Musculoskeletal:  Positive for gait problem. Negative for arthralgias and back pain.   Skin:  Negative for color change and rash.   Neurological:  Positive for facial asymmetry and weakness. Negative for dizziness, seizures, syncope and light-headedness.   Psychiatric/Behavioral:  Negative for behavioral problems.    All other systems reviewed and are negative.      Historical Information   History reviewed. No pertinent past medical history.  History reviewed. No pertinent surgical history.  Social History     Tobacco Use    Smoking status: Not on file    Smokeless tobacco: Not on file   Substance and Sexual Activity    Alcohol use: Not on file    Drug use: Not on file    Sexual activity: Not on file     E-Cigarette/Vaping     E-Cigarette/Vaping Substances     History reviewed. No pertinent family history.  Social History:  Marital  Status:    Occupation: N/A  Patient Pre-hospital Living Situation: Home  Patient Pre-hospital Level of Mobility: unable to be assessed at time of evaluation  Patient Pre-hospital Diet Restrictions: None    Meds/Allergies   I have reviewed home medications with patient family member.  Prior to Admission medications    Not on File     No Known Allergies    Objective :  Temp:  [98.2 °F (36.8 °C)] 98.2 °F (36.8 °C)  HR:  [65-78] 71  BP: (136-179)/(67-92) 156/91  Resp:  [14-20] 20  SpO2:  [95 %-99 %] 99 %  O2 Device: None (Room air)    Physical Exam  Vitals and nursing note reviewed.   Constitutional:       General: He is not in acute distress.     Appearance: He is well-developed.   HENT:      Head: Normocephalic and atraumatic.      Nose: Congestion present.      Mouth/Throat:      Mouth: Mucous membranes are moist.   Eyes:      Conjunctiva/sclera: Conjunctivae normal.   Cardiovascular:      Rate and Rhythm: Normal rate and regular rhythm.      Heart sounds: No murmur heard.  Pulmonary:      Effort: Pulmonary effort is normal. No respiratory distress.      Breath sounds: Normal breath sounds.      Comments: Baseline- Room air  Abdominal:      General: Bowel sounds are normal.      Palpations: Abdomen is soft.      Tenderness: There is no abdominal tenderness.   Musculoskeletal:         General: No swelling.      Cervical back: Neck supple.   Skin:     General: Skin is warm and dry.      Capillary Refill: Capillary refill takes less than 2 seconds.   Neurological:      Mental Status: He is alert and oriented to person, place, and time.      GCS: GCS eye subscore is 3. GCS verbal subscore is 2. GCS motor subscore is 4.      Cranial Nerves: Facial asymmetry present.      Motor: Weakness present.      Coordination: Coordination abnormal.   Psychiatric:         Mood and Affect: Mood normal.         Speech: Speech is slurred.         Cognition and Memory: Memory is impaired.          Lines/Drains:      Lab Results: I  "have reviewed the following results:  Results from last 7 days   Lab Units 04/02/25  2222   WBC Thousand/uL 7.86   HEMOGLOBIN g/dL 14.2   HEMATOCRIT % 43.3   PLATELETS Thousands/uL 168     Results from last 7 days   Lab Units 04/02/25  2222   SODIUM mmol/L 138   POTASSIUM mmol/L 4.3   CHLORIDE mmol/L 105   CO2 mmol/L 28   BUN mg/dL 31*   CREATININE mg/dL 1.23   ANION GAP mmol/L 5   CALCIUM mg/dL 8.7   GLUCOSE RANDOM mg/dL 96     Results from last 7 days   Lab Units 04/02/25  2222   INR  1.01         No results found for: \"HGBA1C\"        Imaging Results Review: I reviewed radiology reports from this admission including: CT a stroke head and neck and CT stroke brain.  Other Study Results Review: EKG was reviewed.     Administrative Statements   I have spent a total time of 50 minutes in caring for this patient on the day of the visit/encounter including Diagnostic results, Patient and family education, Impressions, Documenting in the medical record, Reviewing/placing orders in the medical record (including tests, medications, and/or procedures), Obtaining or reviewing history  , and Communicating with other healthcare professionals .    ** Please Note: This note has been constructed using a voice recognition system. **    "

## 2025-04-04 ENCOUNTER — HOME CARE VISIT (OUTPATIENT)
Dept: HOME HOSPICE | Facility: HOSPICE | Age: 85
End: 2025-04-04
Payer: MEDICARE

## 2025-04-04 VITALS
TEMPERATURE: 97.4 F | RESPIRATION RATE: 24 BRPM | HEART RATE: 62 BPM | SYSTOLIC BLOOD PRESSURE: 118 MMHG | BODY MASS INDEX: 23.8 KG/M2 | OXYGEN SATURATION: 94 % | HEIGHT: 70 IN | DIASTOLIC BLOOD PRESSURE: 94 MMHG | WEIGHT: 166.23 LBS

## 2025-04-04 PROCEDURE — 99239 HOSP IP/OBS DSCHRG MGMT >30: CPT | Performed by: FAMILY MEDICINE

## 2025-04-04 PROCEDURE — 99497 ADVNCD CARE PLAN 30 MIN: CPT | Performed by: FAMILY MEDICINE

## 2025-04-04 RX ORDER — MORPHINE SULFATE 100 MG/5ML
0.25 SOLUTION ORAL EVERY 4 HOURS PRN
Qty: 15 ML | Refills: 0 | Status: SHIPPED | OUTPATIENT
Start: 2025-04-04 | End: 2025-04-04

## 2025-04-04 RX ORDER — BISACODYL 10 MG
10 SUPPOSITORY, RECTAL RECTAL DAILY PRN
Qty: 12 SUPPOSITORY | Refills: 0 | Status: SHIPPED | OUTPATIENT
Start: 2025-04-04 | End: 2025-04-10

## 2025-04-04 RX ORDER — LORAZEPAM 1 MG/1
0.5 TABLET ORAL EVERY 4 HOURS PRN
Qty: 10 TABLET | Refills: 0 | Status: SHIPPED | OUTPATIENT
Start: 2025-04-04 | End: 2025-04-10

## 2025-04-04 RX ORDER — MORPHINE SULFATE 100 MG/5ML
0.25 SOLUTION ORAL EVERY 4 HOURS PRN
Qty: 30 ML | Refills: 0 | Status: SHIPPED | OUTPATIENT
Start: 2025-04-04 | End: 2025-04-10

## 2025-04-04 NOTE — ACP (ADVANCE CARE PLANNING)
Advanced Care Planning Progress Note    Serious Illness Conversation    1. What is your understanding now of where you are with your illness?  Discussed with patient's family.  Patient unable to understand secondary to altered mental status.  Patient's family has a good understanding of the fact that patient may be having a stroke. There is also a suspicion of seizures. With pts wishes previously expressed to them by the patient that he would not want MRI or any other imaging modalities done for him in addition to be not wanting on medications like blood thinners, pts family does not wish to pursue any other investigations at this time. They may want to take him home once they speak with the neurologist.   Later on in the day pt had a Rapid response where he became extremely lethargic and pale and diaphoretic and had worsening of his mental status. Again serious illness conversation was had with the family and they expressed that they want no further investigations and would want to make pt comfort level     2. How much information about what is likely to be ahead with your illness would you like to have?  Patient family wants to be fully informed     3. What did you (clinician) communicate to the patient?  Prognostic Communication: Time - I wish we were not in this situation, but I am worried that time may be as short as weeks to months (express as a range, e.g. days to weeks, weeks to months, months to a year).     4. If your health situation worsens, what are your most important goals?  Goals: be physically comfortable, have my medical decisions respected, be emotionally at peace, be spiritually and emotionally at peace, not be a burden  As per family     5. What are the biggest fears and worries about the future and your health?  Fears/Worries: pain     6. What abilities are so critical to your life that you cannot imagine living without them?  As per family, pt stated previously that he would not want to  undergo examinations like MRI or be on medications like blood thinner     7. What gives you strength as you think about the future with your illness?     8. If you become sicker, how much are you willing to go through for the possibility of gaining more time?  Be in the hospital: Yes Have a feeding tube: No   Be in the ICU: No Live in a nursing home: No   Be on a ventilator: No Be uncomfortable: No   Be on dialysis: No Undergo aggressive test and/or procedures: No   9. How much does your proxy and family know about your priorities and wishes?  Discussion Discussion: extensive discussion with family about goals and wishes     I’ve heard you /family say that being comfortable  is really important to you. Keeping that in mind, and what we know about your illness, I recommend that we we consult hospice care team. This will help us make sure that your treatment plans reflect what’s important to you.     How does this plan sound to you? I will do everything I can to help you through this.     I have spent 45 minutes speaking with my patient on advanced care planning today or during this visit     Advanced directives         Raul Cai MD

## 2025-04-04 NOTE — DISCHARGE SUMMARY
Discharge Summary - Hospitalist   Name: Artie Zavala 84 y.o. male I MRN: [id number]  Unit/Bed#: 2 E 278-01 I Date of Admission: 4/2/2025   Date of Service: 4/4/2025 I Hospital Day: 1     Assessment & Plan  Stroke-like symptoms  Patient presented with strokelike symptoms  While he was in the hospital he had a rapid response where he had altered mental status and inability to follow commands  Neurology was consulted.  He had original discussion with the patient's family and they did not want any further investigations pursued  CTA which was done on admission does show complete M1 occlusion with severe stenosis of bilateral vertebral artery origins  after the rapid response patient's family has made the decision to switch the patient to Comfort care/hospice care    Atrial fibrillation (HCC)  Patient has history of atrial fibrillation. Patient was placed on medications in the past ans refused to continue taking them. To honor patients wishes patients son his POA, states he will hold off on any rate control medications and blood thinners. Risks explained and son states he understands risks but would like to follow fathers wishes.   EKG-atrial fibrillation.  Telemetry ordered.  Ambulatory dysfunction  Patient was also experiencing difficulty ambulating.  Per family patient has dementia and needs help with ADLs at his baseline.  PT/OT evaluation and treat.  Fall precautions.  Hypertension  Blood pressure elevated upon arrival to ED at 179/92, HR 73.  Current /91, HR 71.  Permissive hypertension.  Monitor BP and HR.  Dementia (HCC)  Delirium precautions.  Gerontology consult appreciated.  Thyroid nodule  Incidental finding-Multinodular left lobe thyroid gland, with nodules measuring up to 2.7 cm.   POA notified, that patient will need to follow-up with a ultrasound in the future.     Medical Problems       Resolved Problems  Date Reviewed: 4/3/2025   None       Discharging Physician / Practitioner: Raul Cai  "MD  PCP: Will Riojas MD  Admission Date:   Admission Orders (From admission, onward)       Ordered        04/03/25 1108  INPATIENT ADMISSION  Once            04/03/25 0157  Place in Observation  Once                          Discharge Date: 04/04/25    Consultations During Hospital Stay:  IP CONSULT TO NEUROLOGY  IP CONSULT TO CASE MANAGEMENT  IP CONSULT TO NUTRITION SERVICES  IP CONSULT TO HOSPICE      Procedures Performed:   None    Significant Findings / Test Results:   As above    Incidental Findings:   Incidental thyroid nodule  Discussed with the family.  They have opted for comfort care or hospice care    Test Results Pending at Discharge (will require follow up):   Not applicable     Outpatient Tests Requested:  Not applicable    Reason for Admission: Strokelike symptoms    Hospital Course:   Artie Zavala is a 84 y.o. male patient who originally presented to the hospital on 4/2/2025 due to strokelike symptoms.  Neurology was consulted.  CTA was done initially.  Patient's family did not want to pursue any further investigations like MRI or echocardiogram. They did not want AC for Afib either. Had a rapid response as well after which family opted for comfort care/hospice care      Please see above list of diagnoses and related plan for additional information.     Condition at Discharge: guarded    Discharge Day Visit / Exam:   Subjective:  non verbal today  Vitals: Blood Pressure: 118/94 (04/03/25 1327)  Pulse: 62 (04/04/25 0700)  Temperature: (!) 97.4 °F (36.3 °C) (04/03/25 1206)  Temp Source: Oral (04/03/25 1000)  Respirations: (!) 24 (04/04/25 0700)  Height: 5' 10\" (177.8 cm) (04/03/25 0300)  Weight - Scale: 75.4 kg (166 lb 3.6 oz) (04/03/25 0300)  SpO2: 94 % (04/03/25 1327)  Physical Exam s1,2 (+) irregular  Not following commands  Lungs diminished bases, no crackles, some secretions audible with rhonchi  Abd slightly distended  Mouth breathing  Pallor (+)    Discussion with Family: Updated "  (daughter) at bedside.    Discharge instructions/Information to patient and family:   See after visit summary for information provided to patient and family.      Provisions for Follow-Up Care:  See after visit summary for information related to follow-up care and any pertinent home health orders.      Mobility at time of Discharge:   Basic Mobility Inpatient Raw Score: 6  JH-HLM Goal: 2: Bed activities/Dependent transfer  JH-HLM Achieved: 1: Laying in bed  HLM Goal NOT achieved. Continue to encourage mobility in post discharge setting.     Disposition:   Other: home with home hospice    Planned Readmission: no    Discharge Medications:  See after visit summary for reconciled discharge medications provided to patient and/or family.      Administrative Statements   Discharge Statement:  I have spent a total time of 76 minutes in caring for this patient on the day of the visit/encounter. >30 minutes of time was spent on: Diagnostic results, Prognosis, Risks and benefits of tx options, Instructions for management, Patient and family education, Importance of tx compliance, Risk factor reductions, Impressions, Counseling / Coordination of care, Documenting in the medical record, Reviewing / ordering tests, medicine, procedures  , and Communicating with other healthcare professionals .    **Please Note: This note may have been constructed using a voice recognition system**

## 2025-04-04 NOTE — CASE MANAGEMENT
Case Management Discharge Planning Note    Patient name Artie Zavala  Location 2 EAST 278/2 E 278-01 MRN [id number]  : 1940 Date 2025       Current Admission Date: 2025  Current Admission Diagnosis:Stroke-like symptoms   Patient Active Problem List    Diagnosis Date Noted Date Diagnosed    Stroke-like symptoms 2025     Ambulatory dysfunction 2025     Thyroid nodule 2025     Hypertension 2025     Atrial fibrillation (HCC) 2025     Dementia (HCC) 2025       LOS (days): 1  Geometric Mean LOS (GMLOS) (days): 3  Days to GMLOS:2     OBJECTIVE:  Risk of Unplanned Readmission Score: 7.64         Current admission status: Inpatient   Preferred Pharmacy:   [facility]/pharmacy #[id number] - [place] - [address]  [address]  [place] [address]  Phone: [phone] Fax: [phone]    [facility] [address]  [address] [facility] [address]  Phone: [phone] Fax: [phone]    Primary Care Provider: Will Riojas MD    Primary Insurance: MEDICARE  Secondary Insurance: COMMERCIAL MISCELLANEOUS    DISCHARGE DETAILS:    Discharge planning discussed with:: Patient, Patient's Daughter  Freedom of Choice: Yes  Comments - Freedom of Choice: CM met with patient and daughter at bedside. Introduced self and role. CM reported that  Hospice will see patient in the home tomorrow, but the DME is being delivered to the house today. Patient's daughter confirmed that she just got the call that the DME will be there within the next 30 minutes. CM reported that transportation has been arranged for around 1:00 PM today. Daughter to  meds from [facility] prior to leaving the hospital. She denied any additional questions or concerns at this time.  CM contacted family/caregiver?: Yes  Were Treatment Team discharge recommendations reviewed with patient/caregiver?: Yes  Did patient/caregiver verbalize understanding of  patient care needs?: Yes  Were patient/caregiver advised of the risks associated with not following Treatment Team discharge recommendations?: Yes    Contacts  Patient Contacts: Anum  Relationship to Patient:: Family  Contact Method: In Person  Reason/Outcome: Continuity of Care, Discharge Planning    Requested Home Health Care         Is the patient interested in HHC at discharge?: No    DME Referral Provided  Referral made for DME?: No    Other Referral/Resources/Interventions Provided:  Interventions: Hospice  Referral Comments: CM confirmed that [facility] has accepted and will see patient in the home tomorrow. DME being delivered today. CM submitted a round trip request for 1 pm or later. Round trip to schedule transport home and will email this CM with a confirmed transport time. SLIM made aware that he will need to sign an OOH DNR/DNI form prior to transport.    Would you like to participate in our [facility] service program?  : No - Declined    Treatment Team Recommendation: Hospice  Discharge Destination Plan:: Hospice  Transport at Discharge : [facility]LS Ambulance     Number/Name of Dispatcher: Round Trip  Transported by (Company and Unit #): TBD  ETA of Transport (Date): 04/04/25  ETA of Transport (Time): 1300

## 2025-04-04 NOTE — HOSPICE NOTE
Hospice referral received. Met with son/poa and dtrs x4 at hospital. Discussed medicare hospice benefit. Discussed levels of care in hospice and EOL  process and nutrition and hydration during EOL. Answered all questions. Family stated understanding. at

## 2025-04-04 NOTE — ASSESSMENT & PLAN NOTE
Patient presented with strokelike symptoms  While he was in the hospital he had a rapid response where he had altered mental status and inability to follow commands  Neurology was consulted.  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

## 2025-04-05 ENCOUNTER — HOME CARE VISIT (OUTPATIENT)
Dept: HOME HEALTH SERVICES | Facility: HOME HEALTHCARE | Age: 85
End: 2025-04-05
Payer: MEDICARE

## 2025-04-05 ENCOUNTER — HOME CARE VISIT (OUTPATIENT)
Dept: HOME HOSPICE | Facility: HOSPICE | Age: 85
End: 2025-04-05
Payer: MEDICARE

## 2025-04-05 VITALS
DIASTOLIC BLOOD PRESSURE: 100 MMHG | BODY MASS INDEX: 23.77 KG/M2 | WEIGHT: 166 LBS | HEIGHT: 70 IN | RESPIRATION RATE: 24 BRPM | SYSTOLIC BLOOD PRESSURE: 180 MMHG | HEART RATE: 80 BPM

## 2025-04-05 PROCEDURE — G0299 HHS/HOSPICE OF RN EA 15 MIN: HCPCS

## 2025-04-09 NOTE — TELEPHONE ENCOUNTER
I completed it online, but there was an issue with the address.  It looks like they faxed me a completed form with automatic signature and it so I do not know if there is anything else that needs to be done

## 2025-04-09 NOTE — TELEPHONE ENCOUNTER
A user error has taken place: encounter opened in error, closed for administrative reasons.A user error has taken place: encounter opened in error, closed for administrative reasons.

## 2025-04-09 NOTE — TELEPHONE ENCOUNTER
Yuli from [facility] is requesting that, Dr. Moreno sign the patients death certificate because he was treated by Dr. Moreno in hospice. As per Yuli, nurse Dorothy Contreras RN [id number], provided Dr. Mckeon name and info, as the person to sign the certificate, and we can get in touch with her if additional information on the patients death is required. I did inform Yuli that there is another doctor listed as the patients primary provided but she insist that Dr. Moreno is the patients provider, Yuli will be sending over the patients death certificate for Dr. Moreno to sign.   Yuli also left the number to the [facility] just in case we need to contact them for additional information: [phone]

## 2025-04-10 NOTE — TELEPHONE ENCOUNTER
THIS NEEDS TO BE DONE ASAP OR PATIENT CAN'T BE BURIED TOMORROW     DATE NEEDS TO BE ADDED, THAT IS WHAT IS MISSING, SHE ASKS YOU DO THIS ASAP

## 2025-04-10 NOTE — TELEPHONE ENCOUNTER
Yuli called back and said Dr. Moreno signed the death certificate but he did not date it.  She's asking that he date it and send it back to her.  Any questions you can call her at [phone].

## 2025-04-10 NOTE — TELEPHONE ENCOUNTER
Contacted Yuli and verified with the if she received the online death certificate that was filled out by Dr. Moreno, as per Yuli, she did not check if one was completed. She said she will check and if it was not received she will give us a call back.

## 2025-04-15 ENCOUNTER — TELEPHONE (OUTPATIENT)
Age: 85
End: 2025-04-15

## 2025-04-15 NOTE — TELEPHONE ENCOUNTER
There is an issue in the Pronouncement section that I cannot correct.  Once that is corrected, my portion of the certificate is done and I can certify.

## 2025-04-15 NOTE — TELEPHONE ENCOUNTER
Called Laney- she said she's unsure why it's coming up incorrect but it is the right address. She asked for you to override it, there is an option on the bottom that you select to override and the comment will need to say that the address is correct. The nurse and  home both needed to complete it this way.

## 2025-04-15 NOTE — TELEPHONE ENCOUNTER
It does not let me override, I tried and it gives me a big red [place] with diagonal line across it

## 2025-04-15 NOTE — TELEPHONE ENCOUNTER
Laney from [facility] wanted to let Dr. Moreno know that the state has rejected the paper copy of patients death certificate and provider needs to finish it electronically. If there are any questions please call Laney at [phone]

## 2025-04-15 NOTE — TELEPHONE ENCOUNTER
Called Laney, she spoke with a nurse and it has been corrected. She said you can go back in and certify.

## 2025-04-16 ENCOUNTER — HOME CARE VISIT (OUTPATIENT)
Dept: HOME HOSPICE | Facility: HOSPICE | Age: 85
End: 2025-04-16
Payer: MEDICARE

## 2025-04-16 NOTE — TELEPHONE ENCOUNTER
Yuli from [facility]  [place] called back stating provider should be able to go back and date it. If not, please call [phone]

## 2025-04-17 NOTE — TELEPHONE ENCOUNTER
Please call Yuli or Laney back and let them know he is still unable to complete this, if they would like to fax something to us- please give the direct fax number os [phone]. Thanks!

## 2025-04-18 NOTE — TELEPHONE ENCOUNTER
Spoke with jesi and she states you might have to contact the state to get the date on it, the state wont accept the date on the form,

## 2025-04-21 NOTE — TELEPHONE ENCOUNTER
I tried again.  I cannot do anything to place of death.  Every time I click on anything I get the little ghost [place] red [place] with a line through.  I called that number and it is a fax machine.

## 2025-04-23 NOTE — TELEPHONE ENCOUNTER
Laney phone # [phone]    You are getting a Yellow yield sign because of place of death at the bottom is not recognized -- check box--click over ride the error then after that theres a box, add the reason-- that will let you change the date, then make sure all changes are made    The state if you want to call their help desk # [phone]